# Patient Record
Sex: FEMALE | Race: WHITE | NOT HISPANIC OR LATINO | Employment: OTHER
[De-identification: names, ages, dates, MRNs, and addresses within clinical notes are randomized per-mention and may not be internally consistent; named-entity substitution may affect disease eponyms.]

---

## 2017-01-05 ENCOUNTER — RECORDS - HEALTHEAST (OUTPATIENT)
Dept: ADMINISTRATIVE | Facility: OTHER | Age: 67
End: 2017-01-05

## 2017-03-02 ENCOUNTER — OFFICE VISIT - HEALTHEAST (OUTPATIENT)
Dept: INTERNAL MEDICINE | Facility: CLINIC | Age: 67
End: 2017-03-02

## 2017-03-02 DIAGNOSIS — M17.0 PRIMARY OSTEOARTHRITIS OF BOTH KNEES: ICD-10-CM

## 2017-03-02 DIAGNOSIS — E55.9 VITAMIN D DEFICIENCY: ICD-10-CM

## 2017-03-02 DIAGNOSIS — E11.9 TYPE 2 DIABETES MELLITUS WITHOUT COMPLICATION (H): ICD-10-CM

## 2017-03-02 DIAGNOSIS — E78.5 HYPERLIPIDEMIA: ICD-10-CM

## 2017-03-02 DIAGNOSIS — R53.83 FATIGUE: ICD-10-CM

## 2017-03-02 DIAGNOSIS — Z00.00 ROUTINE GENERAL MEDICAL EXAMINATION AT A HEALTH CARE FACILITY: ICD-10-CM

## 2017-03-02 DIAGNOSIS — R05.3 PERSISTENT COUGH FOR 3 WEEKS OR LONGER: ICD-10-CM

## 2017-03-02 DIAGNOSIS — E04.2 MULTINODULAR GOITER: ICD-10-CM

## 2017-03-02 DIAGNOSIS — I10 ESSENTIAL HYPERTENSION: ICD-10-CM

## 2017-03-02 LAB
CHOLEST SERPL-MCNC: 218 MG/DL
FASTING STATUS PATIENT QL REPORTED: ABNORMAL
HBA1C MFR BLD: 7.3 % (ref 3.5–6)
HDLC SERPL-MCNC: 67 MG/DL
LDLC SERPL CALC-MCNC: 125 MG/DL
TRIGL SERPL-MCNC: 132 MG/DL

## 2017-03-03 ENCOUNTER — AMBULATORY - HEALTHEAST (OUTPATIENT)
Dept: INTERNAL MEDICINE | Facility: CLINIC | Age: 67
End: 2017-03-03

## 2017-03-06 ENCOUNTER — RECORDS - HEALTHEAST (OUTPATIENT)
Dept: ADMINISTRATIVE | Facility: OTHER | Age: 67
End: 2017-03-06

## 2017-04-27 ENCOUNTER — OFFICE VISIT (OUTPATIENT)
Dept: OBGYN | Facility: CLINIC | Age: 67
End: 2017-04-27
Payer: MEDICARE

## 2017-04-27 VITALS — SYSTOLIC BLOOD PRESSURE: 122 MMHG | DIASTOLIC BLOOD PRESSURE: 86 MMHG | HEIGHT: 61 IN

## 2017-04-27 DIAGNOSIS — R39.15 URGENCY OF MICTURITION: ICD-10-CM

## 2017-04-27 DIAGNOSIS — R35.0 FREQUENCY OF URINATION: ICD-10-CM

## 2017-04-27 DIAGNOSIS — Z01.419 ENCOUNTER FOR GYNECOLOGICAL EXAMINATION WITHOUT ABNORMAL FINDING: Primary | ICD-10-CM

## 2017-04-27 DIAGNOSIS — Z11.51 SCREENING FOR HUMAN PAPILLOMAVIRUS: ICD-10-CM

## 2017-04-27 PROCEDURE — 99207 ZZC NO CHARGE LOS: CPT | Performed by: OBSTETRICS & GYNECOLOGY

## 2017-04-27 PROCEDURE — G0101 CA SCREEN;PELVIC/BREAST EXAM: HCPCS | Performed by: OBSTETRICS & GYNECOLOGY

## 2017-04-27 PROCEDURE — G0145 SCR C/V CYTO,THINLAYER,RESCR: HCPCS | Performed by: OBSTETRICS & GYNECOLOGY

## 2017-04-27 NOTE — MR AVS SNAPSHOT
"              After Visit Summary   2017    Arnold Espinosa    MRN: [id number]           Patient Information     Date Of Birth          1950        Visit Information        Provider Department      2017 10:30 AM Last Lr MD [facility]        Today's Diagnoses     Encounter for gynecological examination without abnormal finding    -  1    Urgency of micturition        Frequency of urination           Follow-ups after your visit        Who to contact     If you have questions or need follow up information about today's clinic visit or your schedule please contact [facility] directly at [phone].  Normal or non-critical lab and imaging results will be communicated to you by MyChart, letter or phone within 4 business days after the clinic has received the results. If you do not hear from us within 7 days, please contact the clinic through Message [facility] or phone. If you have a critical or abnormal lab result, we will notify you by phone as soon as possible.  Submit refill requests through [employer] or call your pharmacy and they will forward the refill request to us. Please allow 3 business days for your refill to be completed.          Additional Information About Your Visit        MyChart Information     [employer] lets you send messages to your doctor, view your test results, renew your prescriptions, schedule appointments and more. To sign up, go to [email or web address] . Click on \"Log in\" on the left side of the screen, which will take you to the Welcome page. Then click on \"Sign up Now\" on the right side of the page.     You will be asked to enter the access code listed below, as well as some personal information. Please follow the directions to create your username and password.     Your access code is: [email or web address]  Expires: 2017 10:53 AM     Your access code will  in 90 days. If you need help or a new code, please call your [facility] " "clinic or [phone].        Care EveryWhere ID     This is your Care EveryWhere ID. This could be used by other organizations to access your [facility] medical records  [id number]        Your Vitals Were     Height                   5' 1\" (1.549 m)            Blood Pressure from Last 3 Encounters:   04/27/17 122/86   04/25/16 130/88   06/28/14 128/72    Weight from Last 3 Encounters:   06/28/14 200 lb (90.7 kg)              We Performed the Following     Medicare Limited Visit     Pap imaged thin layer screen reflex to HPV if ASCUS - recommended age 25 - 29 years        Primary Care Provider    None Specified       No primary provider on file.        Thank you!     Thank you for choosing [facility]  for your care. Our goal is always to provide you with excellent care. Hearing back from our patients is one way we can continue to improve our services. Please take a few minutes to complete the written survey that you may receive in the mail after your visit with us. Thank you!             Your Updated Medication List - Protect others around you: Learn how to safely use, store and throw away your medicines at www.disposemymeds.org.          This list is accurate as of: 4/27/17 11:19 AM.  Always use your most recent med list.                   Brand Name Dispense Instructions for use    ASPIRIN PO      Take 81 mg by mouth       calcium carbonate 500 MG tablet    OS-SHANTE 500 mg [place]. [place]     Take 600 mg by mouth 2 times daily       simvastatin 20 MG tablet    ZOCOR     1 TABLET AT BEDTIME       Vitamin D 09179 UNIT Caps      1 CAPSULE DAILY         "

## 2017-04-27 NOTE — PROGRESS NOTES
Arnold is a 66 year old  female who presents for Medicare Limited exam.     Do you have a Health Care Directive?: Yes: Advance Directive has been received and scanned.    HPI :  Patient seen for her annual exam.  She does complain of urinary urgency and frequency.  With some urge incontinence.  She had a previous sling procedure.    GYNECOLOGIC HISTORY:  No LMP recorded. Patient is postmenopausal..   reports that she has never smoked. She has never used smokeless tobacco.  STD testing offered?  Declined    Last PHQ-9 score on record=   PHQ-9 SCORE 2016   Total Score 0     Last GAD7 score on record=   VIPIN-7 SCORE 2016   Total Score 0       HEALTH MAINTENANCE:  Cholesterol:3/2/2017   Last Mammo: one year ago, Result: normal, Next Mammo: will schedule  Pap:  Lab Results   Component Value Date    PAP NIL 2016   DEXA: 2011, osteopenia Spine 2.7, Hip -1.4  Colonoscopy:  2017, Result:  normal, Next Colonoscopy: 5 years    HISTORY:  Obstetric History       T0      TAB0   SAB0   E0   M0   L0         No past medical history on file.  No past surgical history on file.  No family history on file.  Social History     Social History     Marital status: Single     Spouse name: N/A     Number of children: N/A     Years of education: N/A     Social History Main Topics     Smoking status: Never Smoker     Smokeless tobacco: Never Used     Alcohol use 0.0 oz/week     0 Standard drinks or equivalent per week      Comment: 0-1 drink per month     Drug use: No     Sexual activity: Not Currently     Partners: Male     Birth control/ protection: Post-menopausal     Other Topics Concern     Not on file     Social History Narrative     Current Outpatient Prescriptions   Medication Sig     ASPIRIN PO Take 81 mg by mouth     calcium carbonate (OS-SHANTE 500 MG [place]. [place]) 500 MG tablet Take 600 mg by mouth 2 times daily     VITAMIN D 49421 UNIT OR CAPS 1 CAPSULE DAILY     SIMVASTATIN 20 MG OR TABS 1  "TABLET AT BEDTIME     No current facility-administered medications for this visit.      Allergies   Allergen Reactions     Sulfa Drugs Hives       Past medical, surgical, social and family history were reviewed and updated in EPIC.    EXAM:  /86  Ht 5' 1\" (1.549 m)   BMI: There is no height or weight on file to calculate BMI.    Constitutional: Appearance: Well nourished, well developed alert, in no acute distress  Breasts: Inspection of Breasts:  No lymphadenopathy present    Palpation of Breasts and Axillae:  No masses present on palpation, no  breast tenderness    Axillary Lymph Nodes:  No lymphadenopathy present  Neurologic/Psychiatric:    Mental Status:  Oriented X3     Pelvic Exam:  External Genitalia:     Normal appearance for age, no discharge present, no tenderness present, no inflammatory lesions present, color normal  Vagina:     Normal vaginal vault without central or paravaginal defects, no discharge present, no inflammatory lesions present, no masses present  Bladder:     Nontender to palpation  Urethra:   Urethral Body:  Urethra palpation normal, urethra structural support normal   Urethral Meatus:  No erythema or lesions present  Cervix:     Appearance healthy, no lesions present, nontender to palpation, no bleeding present  Uterus:     Nontender to palpation, no masses present, position anteflexed, mobility: normal  Adnexa:     No adnexal tenderness present, no adnexal masses present  Perineum:     Perineum within normal limits, no evidence of trauma, no rashes or skin lesions present  Anus:     Anus within normal limits, no hemorrhoids present  Inguinal Lymph Nodes:     No lymphadenopathy present  Pubic Hair:     Normal pubic hair distribution for age  Genitalia and Groin:     No rashes present, no lesions present, no areas of discoloration, no masses present    There is no height or weight on file to calculate BMI.  Weight management plan: Patient was referred to their PCP to discuss a diet " and exercise plan.   Patient declined weight today   reports that she has never smoked. She has never used smokeless tobacco.      ASSESSMENT:  66 year old female with satisfactory annual exam.    ICD-10-CM    1. Encounter for gynecological examination without abnormal finding Z01.419 Pap imaged thin layer screen reflex to HPV if ASCUS - recommended age 25 - 29 years     Medicare Limited Visit   2. Urgency of micturition R39.15    3. Frequency of urination R35.0        COUNSELING:   Reviewed preventive health counseling, as reflected in patient instructions       Regular exercise       Healthy diet/nutrition    PLAN/PATIENT INSTRUCTIONS:    Plan: The patient is given information about metroplasty and urgent PCP.  I recommend that she consider the urgent PC.  She is not a candidate for InterStim because of previous back surgeries.  She will obtain her mammogram through [facility].        Last Lr MD

## 2017-04-27 NOTE — LETTER
May 8, 2017    Arnold Espinosa  [address] [place] [address]    Dear Arnold,  We are happy to inform you that your PAP smear result from 4/27/17 is normal.  We are now able to do a follow up test on PAP smears. The DNA test is for HPV (Human Papilloma Virus). Cervical cancer is closely linked with certain types of HPV. Your result showed no evidence of high risk HPV.  Therefore we recommend you return in 3 years for your next pap smear and HPV test.  You will still need to return to the clinic every year for an annual exam and other preventive tests.  Please contact the clinic at [phone] with any questions.  Sincerely,    Last Lr MD/lety

## 2017-05-01 LAB
COPATH REPORT: NORMAL
PAP: NORMAL

## 2017-05-01 PROCEDURE — 87624 HPV HI-RISK TYP POOLED RSLT: CPT | Performed by: OBSTETRICS & GYNECOLOGY

## 2017-05-04 LAB
FINAL DIAGNOSIS: NORMAL
HPV HR 12 DNA CVX QL NAA+PROBE: NEGATIVE
HPV16 DNA SPEC QL NAA+PROBE: NEGATIVE
HPV18 DNA SPEC QL NAA+PROBE: NEGATIVE
SPECIMEN DESCRIPTION: NORMAL

## 2017-05-06 ENCOUNTER — COMMUNICATION - HEALTHEAST (OUTPATIENT)
Dept: INTERNAL MEDICINE | Facility: CLINIC | Age: 67
End: 2017-05-06

## 2017-05-08 ENCOUNTER — RECORDS - HEALTHEAST (OUTPATIENT)
Dept: ADMINISTRATIVE | Facility: OTHER | Age: 67
End: 2017-05-08

## 2017-05-21 ENCOUNTER — COMMUNICATION - HEALTHEAST (OUTPATIENT)
Dept: INTERNAL MEDICINE | Facility: CLINIC | Age: 67
End: 2017-05-21

## 2017-05-22 ENCOUNTER — AMBULATORY - HEALTHEAST (OUTPATIENT)
Dept: INTERNAL MEDICINE | Facility: CLINIC | Age: 67
End: 2017-05-22

## 2017-06-22 ENCOUNTER — RECORDS - HEALTHEAST (OUTPATIENT)
Dept: ADMINISTRATIVE | Facility: OTHER | Age: 67
End: 2017-06-22

## 2017-06-22 ENCOUNTER — RECORDS - HEALTHEAST (OUTPATIENT)
Dept: BONE DENSITY | Facility: CLINIC | Age: 67
End: 2017-06-22

## 2017-06-22 DIAGNOSIS — Z00.00 ENCOUNTER FOR GENERAL ADULT MEDICAL EXAMINATION WITHOUT ABNORMAL FINDINGS: ICD-10-CM

## 2017-07-20 ENCOUNTER — RECORDS - HEALTHEAST (OUTPATIENT)
Dept: ADMINISTRATIVE | Facility: OTHER | Age: 67
End: 2017-07-20

## 2018-03-16 ENCOUNTER — RECORDS - HEALTHEAST (OUTPATIENT)
Dept: ADMINISTRATIVE | Facility: OTHER | Age: 68
End: 2018-03-16

## 2018-03-30 ENCOUNTER — RECORDS - HEALTHEAST (OUTPATIENT)
Dept: ADMINISTRATIVE | Facility: OTHER | Age: 68
End: 2018-03-30

## 2018-05-15 ENCOUNTER — RECORDS - HEALTHEAST (OUTPATIENT)
Dept: ADMINISTRATIVE | Facility: OTHER | Age: 68
End: 2018-05-15

## 2018-06-16 ENCOUNTER — COMMUNICATION - HEALTHEAST (OUTPATIENT)
Dept: INTERNAL MEDICINE | Facility: CLINIC | Age: 68
End: 2018-06-16

## 2018-07-17 ENCOUNTER — COMMUNICATION - HEALTHEAST (OUTPATIENT)
Dept: INTERNAL MEDICINE | Facility: CLINIC | Age: 68
End: 2018-07-17

## 2018-07-20 ENCOUNTER — RECORDS - HEALTHEAST (OUTPATIENT)
Dept: ADMINISTRATIVE | Facility: OTHER | Age: 68
End: 2018-07-20

## 2018-07-24 ENCOUNTER — OFFICE VISIT - HEALTHEAST (OUTPATIENT)
Dept: INTERNAL MEDICINE | Facility: CLINIC | Age: 68
End: 2018-07-24

## 2018-07-24 DIAGNOSIS — E55.9 VITAMIN D DEFICIENCY: ICD-10-CM

## 2018-07-24 DIAGNOSIS — R35.0 URINARY FREQUENCY: ICD-10-CM

## 2018-07-24 DIAGNOSIS — E78.5 HYPERLIPIDEMIA: ICD-10-CM

## 2018-07-24 DIAGNOSIS — M17.0 PRIMARY OSTEOARTHRITIS OF BOTH KNEES: ICD-10-CM

## 2018-07-24 DIAGNOSIS — M85.80 OSTEOPENIA: ICD-10-CM

## 2018-07-24 DIAGNOSIS — R79.89 ABNORMAL LFTS: ICD-10-CM

## 2018-07-24 DIAGNOSIS — E11.9 TYPE 2 DIABETES MELLITUS WITHOUT COMPLICATION, WITHOUT LONG-TERM CURRENT USE OF INSULIN (H): ICD-10-CM

## 2018-07-24 DIAGNOSIS — I10 ESSENTIAL HYPERTENSION: ICD-10-CM

## 2018-07-24 DIAGNOSIS — E66.01 MORBID OBESITY (H): ICD-10-CM

## 2018-07-24 LAB
ALBUMIN SERPL-MCNC: 4.1 G/DL (ref 3.5–5)
ALBUMIN UR-MCNC: ABNORMAL MG/DL
ALP SERPL-CCNC: 132 U/L (ref 45–120)
ALT SERPL W P-5'-P-CCNC: 61 U/L (ref 0–45)
ANION GAP SERPL CALCULATED.3IONS-SCNC: 11 MMOL/L (ref 5–18)
APPEARANCE UR: ABNORMAL
AST SERPL W P-5'-P-CCNC: 48 U/L (ref 0–40)
BILIRUB DIRECT SERPL-MCNC: 0.3 MG/DL
BILIRUB SERPL-MCNC: 0.9 MG/DL (ref 0–1)
BILIRUB UR QL STRIP: NEGATIVE
BUN SERPL-MCNC: 21 MG/DL (ref 8–22)
CALCIUM SERPL-MCNC: 10.4 MG/DL (ref 8.5–10.5)
CHLORIDE BLD-SCNC: 106 MMOL/L (ref 98–107)
CHOLEST SERPL-MCNC: 233 MG/DL
CO2 SERPL-SCNC: 26 MMOL/L (ref 22–31)
COLOR UR AUTO: YELLOW
CREAT SERPL-MCNC: 0.84 MG/DL (ref 0.6–1.1)
CREAT UR-MCNC: 172.3 MG/DL
FASTING STATUS PATIENT QL REPORTED: ABNORMAL
FERRITIN SERPL-MCNC: 98 NG/ML (ref 10–130)
GFR SERPL CREATININE-BSD FRML MDRD: >60 ML/MIN/1.73M2
GLUCOSE BLD-MCNC: 173 MG/DL (ref 70–125)
GLUCOSE UR STRIP-MCNC: NEGATIVE MG/DL
HBA1C MFR BLD: 10.4 % (ref 3.5–6)
HDLC SERPL-MCNC: 71 MG/DL
HGB BLD-MCNC: 16.1 G/DL (ref 12–16)
HGB UR QL STRIP: ABNORMAL
KETONES UR STRIP-MCNC: NEGATIVE MG/DL
LDLC SERPL CALC-MCNC: 133 MG/DL
LEUKOCYTE ESTERASE UR QL STRIP: ABNORMAL
MICROALBUMIN UR-MCNC: 22.82 MG/DL (ref 0–1.99)
MICROALBUMIN/CREAT UR: 132.4 MG/G
NITRATE UR QL: NEGATIVE
PH UR STRIP: 5.5 [PH] (ref 5–8)
POTASSIUM BLD-SCNC: 4.3 MMOL/L (ref 3.5–5)
PROT SERPL-MCNC: 7.4 G/DL (ref 6–8)
SODIUM SERPL-SCNC: 143 MMOL/L (ref 136–145)
SP GR UR STRIP: >=1.03 (ref 1–1.03)
TRIGL SERPL-MCNC: 147 MG/DL
UROBILINOGEN UR STRIP-ACNC: ABNORMAL

## 2018-07-25 ENCOUNTER — COMMUNICATION - HEALTHEAST (OUTPATIENT)
Dept: INTERNAL MEDICINE | Facility: CLINIC | Age: 68
End: 2018-07-25

## 2018-07-25 LAB
25(OH)D3 SERPL-MCNC: 54.3 NG/ML (ref 30–80)
25(OH)D3 SERPL-MCNC: 54.3 NG/ML (ref 30–80)
BACTERIA SPEC CULT: ABNORMAL
BACTERIA SPEC CULT: ABNORMAL
HCV AB SERPL QL IA: NEGATIVE

## 2018-07-29 ENCOUNTER — AMBULATORY - HEALTHEAST (OUTPATIENT)
Dept: INTERNAL MEDICINE | Facility: CLINIC | Age: 68
End: 2018-07-29

## 2018-09-16 ENCOUNTER — COMMUNICATION - HEALTHEAST (OUTPATIENT)
Dept: INTERNAL MEDICINE | Facility: CLINIC | Age: 68
End: 2018-09-16

## 2018-09-16 DIAGNOSIS — E78.5 HYPERLIPIDEMIA: ICD-10-CM

## 2018-10-15 ENCOUNTER — COMMUNICATION - HEALTHEAST (OUTPATIENT)
Dept: INTERNAL MEDICINE | Facility: CLINIC | Age: 68
End: 2018-10-15

## 2019-02-25 ENCOUNTER — RECORDS - HEALTHEAST (OUTPATIENT)
Dept: ADMINISTRATIVE | Facility: OTHER | Age: 69
End: 2019-02-25

## 2019-03-04 ENCOUNTER — RECORDS - HEALTHEAST (OUTPATIENT)
Dept: ADMINISTRATIVE | Facility: OTHER | Age: 69
End: 2019-03-04

## 2019-03-18 ENCOUNTER — RECORDS - HEALTHEAST (OUTPATIENT)
Dept: ADMINISTRATIVE | Facility: OTHER | Age: 69
End: 2019-03-18

## 2019-03-27 ENCOUNTER — COMMUNICATION - HEALTHEAST (OUTPATIENT)
Dept: INTERNAL MEDICINE | Facility: CLINIC | Age: 69
End: 2019-03-27

## 2019-03-27 DIAGNOSIS — E78.5 HYPERLIPIDEMIA: ICD-10-CM

## 2019-04-25 ENCOUNTER — RECORDS - HEALTHEAST (OUTPATIENT)
Dept: ADMINISTRATIVE | Facility: OTHER | Age: 69
End: 2019-04-25

## 2019-05-21 ENCOUNTER — RECORDS - HEALTHEAST (OUTPATIENT)
Dept: ADMINISTRATIVE | Facility: OTHER | Age: 69
End: 2019-05-21

## 2019-06-25 ENCOUNTER — COMMUNICATION - HEALTHEAST (OUTPATIENT)
Dept: INTERNAL MEDICINE | Facility: CLINIC | Age: 69
End: 2019-06-25

## 2019-06-25 DIAGNOSIS — E78.5 HYPERLIPIDEMIA: ICD-10-CM

## 2019-07-01 ENCOUNTER — COMMUNICATION - HEALTHEAST (OUTPATIENT)
Dept: INTERNAL MEDICINE | Facility: CLINIC | Age: 69
End: 2019-07-01

## 2019-07-01 DIAGNOSIS — E78.5 HYPERLIPIDEMIA: ICD-10-CM

## 2019-07-11 ENCOUNTER — RECORDS - HEALTHEAST (OUTPATIENT)
Dept: ADMINISTRATIVE | Facility: OTHER | Age: 69
End: 2019-07-11

## 2019-09-12 ENCOUNTER — RECORDS - HEALTHEAST (OUTPATIENT)
Dept: ADMINISTRATIVE | Facility: OTHER | Age: 69
End: 2019-09-12

## 2019-09-23 ENCOUNTER — COMMUNICATION - HEALTHEAST (OUTPATIENT)
Dept: INTERNAL MEDICINE | Facility: CLINIC | Age: 69
End: 2019-09-23

## 2019-09-23 DIAGNOSIS — E11.9 TYPE 2 DIABETES MELLITUS WITHOUT COMPLICATION, WITHOUT LONG-TERM CURRENT USE OF INSULIN (H): ICD-10-CM

## 2019-12-03 ENCOUNTER — RECORDS - HEALTHEAST (OUTPATIENT)
Dept: ADMINISTRATIVE | Facility: OTHER | Age: 69
End: 2019-12-03

## 2020-03-19 ENCOUNTER — RECORDS - HEALTHEAST (OUTPATIENT)
Dept: ADMINISTRATIVE | Facility: OTHER | Age: 70
End: 2020-03-19

## 2020-06-05 ENCOUNTER — RECORDS - HEALTHEAST (OUTPATIENT)
Dept: ADMINISTRATIVE | Facility: OTHER | Age: 70
End: 2020-06-05

## 2020-07-01 ENCOUNTER — RECORDS - HEALTHEAST (OUTPATIENT)
Dept: ADMINISTRATIVE | Facility: OTHER | Age: 70
End: 2020-07-01

## 2020-07-01 ENCOUNTER — RECORDS - HEALTHEAST (OUTPATIENT)
Dept: BONE DENSITY | Facility: CLINIC | Age: 70
End: 2020-07-01

## 2020-07-01 DIAGNOSIS — Z13.820 ENCOUNTER FOR SCREENING FOR OSTEOPOROSIS: ICD-10-CM

## 2020-07-01 DIAGNOSIS — Z78.0 ASYMPTOMATIC MENOPAUSAL STATE: ICD-10-CM

## 2020-09-24 ENCOUNTER — RECORDS - HEALTHEAST (OUTPATIENT)
Dept: ADMINISTRATIVE | Facility: OTHER | Age: 70
End: 2020-09-24

## 2021-05-27 NOTE — TELEPHONE ENCOUNTER
Refill Approved    Rx renewed per Medication Renewal Policy. Medication was last renewed on 9/17/18.    Alexandra Morgan, [facility] Connection Triage/Med Refill 3/27/2019     Requested Prescriptions   Pending Prescriptions Disp Refills     simvastatin (ZOCOR) 20 MG tablet [Pharmacy Med Name: SIMVASTATIN 20MG TABLETS] 90 tablet 0     Sig: TAKE 1 TABLET(20 MG) BY MOUTH DAILY    Statins Refill Protocol (Hmg CoA Reductase Inhibitors) Passed - 3/27/2019  1:13 PM       Passed - PCP or prescribing provider visit in past 12 months     Last office visit with prescriber/PCP: Visit date not found OR same dept: Visit date not found OR same specialty: Visit date not found  Last physical: 7/24/2018 Last MTM visit: Visit date not found   Next visit within 3 mo: Visit date not found  Next physical within 3 mo: Visit date not found  Prescriber OR PCP: José Miguel Hernandez MD  Last diagnosis associated with med order: 1. Hyperlipidemia  - simvastatin (ZOCOR) 20 MG tablet [Pharmacy Med Name: SIMVASTATIN 20MG TABLETS]; TAKE 1 TABLET(20 MG) BY MOUTH DAILY  Dispense: 90 tablet; Refill: 0    If protocol passes may refill for 12 months if within 3 months of last provider visit (or a total of 15 months).

## 2021-05-29 ENCOUNTER — RECORDS - HEALTHEAST (OUTPATIENT)
Dept: ADMINISTRATIVE | Facility: CLINIC | Age: 71
End: 2021-05-29

## 2021-05-30 ENCOUNTER — RECORDS - HEALTHEAST (OUTPATIENT)
Dept: ADMINISTRATIVE | Facility: CLINIC | Age: 71
End: 2021-05-30

## 2021-05-30 VITALS — BODY MASS INDEX: 35.9 KG/M2 | HEIGHT: 61 IN

## 2021-05-30 NOTE — TELEPHONE ENCOUNTER
Due to be seen    Rx renewed per Medication Renewal Policy. Medication was last renewed on 3/27/19.    Alexandra Morgan, Care Connection Triage/Med Refill 7/2/2019     Requested Prescriptions   Pending Prescriptions Disp Refills     simvastatin (ZOCOR) 20 MG tablet [Pharmacy Med Name: SIMVASTATIN 20MG TABLETS] 90 tablet 0     Sig: TAKE 1 TABLET(20 MG) BY MOUTH DAILY       Statins Refill Protocol (Hmg CoA Reductase Inhibitors) Passed - 7/1/2019  1:46 PM        Passed - PCP or prescribing provider visit in past 12 months      Last office visit with prescriber/PCP: Visit date not found OR same dept: Visit date not found OR same specialty: Visit date not found  Last physical: 7/24/2018 Last MTM visit: Visit date not found   Next visit within 3 mo: Visit date not found  Next physical within 3 mo: Visit date not found  Prescriber OR PCP: José Miguel Hernandez MD  Last diagnosis associated with med order: 1. Hyperlipidemia  - simvastatin (ZOCOR) 20 MG tablet [Pharmacy Med Name: SIMVASTATIN 20MG TABLETS]; TAKE 1 TABLET(20 MG) BY MOUTH DAILY  Dispense: 90 tablet; Refill: 0    If protocol passes may refill for 12 months if within 3 months of last provider visit (or a total of 15 months).

## 2021-06-01 VITALS — BODY MASS INDEX: 35.9 KG/M2 | HEIGHT: 61 IN

## 2021-06-01 NOTE — TELEPHONE ENCOUNTER
RN cannot approve Refill Request    RN can NOT refill this medication Protocol failed and NO refill given.         Alexandra Morgan, Care Connection Triage/Med Refill 9/23/2019    Requested Prescriptions   Pending Prescriptions Disp Refills     metFORMIN (GLUCOPHAGE-XR) 500 MG 24 hr tablet [Pharmacy Med Name: METFORMIN ER 500MG 24HR TABS] 90 tablet 3     Sig: TAKE 1 TABLET(500 MG) BY MOUTH DAILY WITH BREAKFAST       Metformin Refill Protocol Failed - 9/23/2019  5:10 AM        Failed - Blood pressure in last 12 months     BP Readings from Last 1 Encounters:   07/24/18 140/86             Failed - LFT or AST or ALT in last 12 months     Albumin   Date Value Ref Range Status   07/24/2018 4.1 3.5 - 5.0 g/dL Final     Bilirubin, Total   Date Value Ref Range Status   07/24/2018 0.9 0.0 - 1.0 mg/dL Final     Bilirubin, Direct   Date Value Ref Range Status   07/24/2018 0.3 <=0.5 mg/dL Final     Alkaline Phosphatase   Date Value Ref Range Status   07/24/2018 132 (H) 45 - 120 U/L Final     AST   Date Value Ref Range Status   07/24/2018 48 (H) 0 - 40 U/L Final     ALT   Date Value Ref Range Status   07/24/2018 61 (H) 0 - 45 U/L Final     Protein, Total   Date Value Ref Range Status   07/24/2018 7.4 6.0 - 8.0 g/dL Final                Failed - GFR or Serum Creatinine in last 6 months     GFR MDRD Non Af Amer   Date Value Ref Range Status   07/24/2018 >60 >60 mL/min/1.73m2 Final     GFR MDRD Af Amer   Date Value Ref Range Status   07/24/2018 >60 >60 mL/min/1.73m2 Final             Failed - Visit with PCP or prescribing provider visit in last 6 months or next 3 months     Last office visit with prescriber/PCP: Visit date not found OR same dept: Visit date not found OR same specialty: Visit date not found Last physical: Visit date not found Last MTM visit: Visit date not found         Next appt within 3 mo: Visit date not found  Next physical within 3 mo: Visit date not found  Prescriber OR PCP: José Miguel Hernandez MD  Last diagnosis  associated with med order: There are no diagnoses linked to this encounter.   If protocol passes may refill for 12 months if within 3 months of last provider visit (or a total of 15 months).           Failed - A1C in last 6 months     Hemoglobin A1c   Date Value Ref Range Status   07/24/2018 10.4 (H) 3.5 - 6.0 % Final               Failed - Microalbumin in last year      Microalbumin, Random Urine   Date Value Ref Range Status   07/24/2018 22.82 (H) 0.00 - 1.99 mg/dL Final

## 2021-06-09 NOTE — PROGRESS NOTES
Office Visit - Physical   Erin Espinosa   66 y.o.  female    Date of visit: 3/2/2017  Physician: José Miguel Hernandez MD     Assessment and Plan   1. Routine general medical examination at a health care facility  Immunizations reviewed and will provide Tdap.  Recommending Prevnar when turning 67 and Pneumovax one year later.  She declines having a flu shot.  She sees her ObGyn annually and has an appointment scheduled.  She continues to get a mammogram every year.  She had a colonoscopy in January 2017 and this should be repeated every 5 years with history of polyps.  She is exercising on a regular basis.  Nonsmoker.  Skin exam performed.  I would recommend repeating DEXA at age 67.  Her last was normal.  - DXA Bone Density Scan; Future    2. Type 2 diabetes mellitus without complication  Diabetes previously diet controlled but last hemoglobin A1c 7.6%.  She is getting an annual eye exam.  - Basic Metabolic Panel  - Microalbumin, Random Urine  - Urinalysis  - Glycosylated Hemoglobin A1c    Addendum-hemoglobin A1c remains over 7%.  We discussed starting Metformin XR and I will recommend when sending her results    3. Essential hypertension  Blood pressure is looking adequately controlled    4. Fatigue  May be related to diabetes.  We'll proceed with metabolic evaluation to exclude other etiologies.  - HM2(CBC w/o Differential)  - Thyroid Stimulating Hormone (TSH)    5. Hyperlipidemia  Remains on simvastatin.  She takes aspirin 81 mg daily.  - Lipid Cascade  - Hepatic Profile; Future  - Hepatic Profile    6. Multinodular goiter  Stable    7. Primary osteoarthritis of both knees  She has had both knees replaced    8. Vitamin D deficiency  She is on vitamin D replacement taking 50,000 units weekly.  We'll recheck level and if adequate, change to 5000 units daily  - Vitamin D, Total (25-Hydroxy)    9. Persistent cough for 3 weeks or longer  Persistent cough and complains of some dyspnea with exertion.  She is getting  some vague discomfort in her right upper back.  This pain is not related to exertion.  - XR Chest PA and Lateral    Return in about 1 year (around 3/2/2018) for Annual physical.     Chief Complaint   Chief Complaint   Patient presents with     Annual Exam        Patient Profile   Social History     Social History Narrative        Past Medical History   Patient Active Problem List   Diagnosis     Hypertension     S/P total knee arthroplasty     Type 2 diabetes mellitus without complication     Multinodular goiter     Irritable bowel syndrome     Osteoarthritis of both knees     Vitamin D deficiency     Hepatic steatosis     Lung abnormality     Hyperlipidemia     Glaucoma     Fatigue     Persistent cough for 3 weeks or longer       Past Surgical History  She has a past surgical history that includes Eye surgery; epidural abscess (2011); Carpal tunnel release; Cervical fusion (2005); Dilation and curettage of uterus (2003); total knee arthroplasty (Right, 7/29/2015); Total knee arthroplasty (Left, 2014); Cataract extraction (Bilateral); Posterior laminectomy / decompression lumbar spine; Bladder repair (2004); Tonsillectomy; Colonoscopy (2012 normal); and Rotator cuff repair (03/2016).     History of Present Illness   This 66 y.o. old woman is here for an annual physical, to follow-up chronic medical problems, and to discuss some new concerns.  She is felt significant fatigue and low energy.  She is concerned about her diabetes control.  Her last hemoglobin A1c was 7.6% ,previously it was diet controlled.  She currently does not check her sugars at home.  She is also having difficulty losing any weight despite watching her diet carefully, restricting her carbs, and exercising regularly with aerobics.  She had successful left rotator cuff surgery last year.  She has known multinodular goiter and this was last evaluated in 2014.  No history of hypothyroidism.  He continues to take simvastatin to manage her lipids.  She  has an infected tooth and will be having a root canal later today.  She has had both knees replaced.  She will take amoxicillin prior to her dental appointments.  She is also getting over an upper respiratory infection.  She does describe pain in her upper back.  This is not worse with exertion.  She has noticed some increasing dyspnea with exertion but no exertional chest pain.    Review of Systems: A comprehensive review of systems was negative except as noted.  General: There is increasing fatigue, difficulties losing weight, no fevers, chills, or night sweats  Eyes: No significant change in vision.  Seeing ophthalmologist regularly.  ENT: No ear or sinus infections.  Some hearing loss  Respiratory: Recent cough.  Some dyspnea with exertion.  Cardiovascular: No chest pain, palpitations, dizziness, or syncope.  No peripheral edema.  GI: No abdominal pain, reflux symptoms, dysphagia, nausea, vomiting, constipation, or diarrhea  : No change in frequency or incontinence.  No hematuria.  Skin: No new rashes or lesions.  Neurologic: No headaches, seizures, dizziness, weakness.  Chronic numbness involving both feet  Musculoskeletal: Chronic pain related to osteoarthritis and chronic low back pain.  She has had multiple back surgeries.  Lymphatic: No swollen lymph nodes  Psychiatric: No depression, anxiety, or sleep disorder.       Medications and Allergies   Current Outpatient Prescriptions   Medication Sig Dispense Refill     aspirin 81 mg chewable tablet Chew 81 mg daily.       CALCIUM CARBONATE/VITAMIN D3 (CALCIUM 600 + D,3, ORAL) Take 1 tablet by mouth daily.       simvastatin (ZOCOR) 20 MG tablet Take 1 tablet (20 mg total) by mouth daily. 90 tablet 3     VITAMIN D2 50,000 unit capsule TAKE ONE CAPSULE BY MOUTH ONCE WEEKLY ON SATURDAYS 12 capsule 3     No current facility-administered medications for this visit.      Allergies   Allergen Reactions     Sulfa (Sulfonamide Antibiotics) Hives     Trouble breathing     "    Family and Social History   Family History   Problem Relation Age of Onset     Anesthesia problems Father      PONV, AGITAED     Alzheimer's disease Father      Lung cancer Mother         Social History   Substance Use Topics     Smoking status: Never Smoker     Smokeless tobacco: None     Alcohol use Yes        Physical Exam   General Appearance:   Overweight middle-aged woman who otherwise appears well    Visit Vitals     /72 (Patient Site: Left Arm, Patient Position: Sitting, Cuff Size: Adult Large)     Pulse (!) 109     Ht 5' 1\" (1.549 m)     SpO2 96%       EYES: Eyelids, conjunctiva, and sclera were normal. Pupils were normal. Cornea, iris, and lens were normal bilaterally.  HEAD, EARS, NOSE, MOUTH, AND THROAT: Head and face were normal. Nose appearance was normal and there was no discharge. Oropharynx was normal.  NECK: Neck appearance was normal. There were no neck masses and the thyroid was not enlarged and no nodules are felt.  No lymphadenopathy.  RESPIRATORY: Breathing pattern was normal and the chest moved symmetrically.  Percussion/auscultatory percussion was normal.  Lung sounds were normal and there were no rales or wheezes.  CARDIOVASCULAR: Heart rate and rhythm were normal.  S1 and S2 were normal and there were no extra sounds or murmurs. Peripheral pulses in arms and legs were normal.  Jugular venous pressure was normal.  There was no peripheral edema.  No carotid bruits.  GASTROINTESTINAL: The abdomen was normal in contour.  Bowel sounds were present.  Percussion detected no organ enlargement or tenderness.  Palpation detected no tenderness, mass, or enlarged organs.   MUSCULOSKELETAL: Skeletal configuration was normal and muscle mass was normal for age. Joint appearance was overall normal.  LYMPHATIC: There were no enlarged nodes.  SKIN/HAIR/NAILS: Skin color was normal.  There were no skin lesions.  Hair and nails were normal.  FOOT EXAM: Normal vibratory sensation.  Good pedal pulses. "  No open sores or other lesions.  NEUROLOGIC: The patient was alert and oriented to person, place, time, and circumstance. Speech was normal. Cranial nerves were normal. Motor strength was normal for age. The patient was normally coordinated.  Sensation intact.  PSYCHIATRIC:  Mood and affect were normal and the patient had normal recent and remote memory. The patient's judgment and insight were normal.       Additional Information        José Miguel Hernandez MD  Internal Medicine  Contact me at [phone]

## 2021-06-19 NOTE — PROGRESS NOTES
Office Visit - Follow Up   Erin Espinosa   68 y.o. female    Date of Visit: 7/24/2018    Chief Complaint   Patient presents with     Annual Wellness Visit        Assessment and Plan   1. Type 2 diabetes mellitus without complication, without long-term current use of insulin (H)  A1c elevated last year over 7%.  Recommended metformin but did not start.  Has tried to work on lifestyle modification and has lost some weight.  Recheck A1c.  If still over 7%, would recommend starting 500 mg daily.  Will also recheck microalbumin.  Slightly elevated last year and discussed starting low-dose lisinopril if this remains elevated.  She is seeing an eye doctor every year.  Diabetic foot exam completed.  No peripheral neuropathy.  She does take a statin.  - Glycosylated Hemoglobin A1c  - Microalbumin, Random Urine  - Basic Metabolic Panel    2. Essential hypertension  Blood pressure running better outside of the office than what we saw today.  Consider low-dose lisinopril  - Hemoglobin    3. Hyperlipidemia  Recheck lipid profile on simvastatin.  She takes aspirin daily.  - Lipid Cascade    4. Primary osteoarthritis of both knees  She has had both knees replaced.    5. Morbid obesity (H)  We discussed the importance of further weight loss with more regular exercise and diet modification to reduce her risks    6. Osteopenia  We will plan to repeat a DEXA in 2019.  T score -2.0 last year.  Decreased from previous.  She tries to get adequate calcium in her diet and is taking vitamin D daily    7. Vitamin D deficiency  Recheck vitamin D level now that she is taking 5000 units daily rather than 50,000 units weekly  - Vitamin D, Total (25-Hydroxy)    8. Abnormal LFTs  Presumably related to fatty liver but will check appropriate studies.  - Hepatic Profile  - Hepatitis C Antibody (Anti-HCV)  - Ferritin    9. Urinary frequency  Probable overactive bladder.  She will follow-up with OB/GYN.  Suggesting Dr. Alarcon.  I am also  recommending Dr. Clay as a urologist  - Urinalysis-UC if Indicated      10.  Healthcare maintenance.  Prevnar provided.  Discussed Shingrix.  Up-to-date with colonoscopies.  Continues to get annual mammograms.  She will follow-up with OB/GYN.  Non-smoker.  We discussed getting more regular exercise.      Return in about 1 year (around 7/24/2019) for Annual physical.     History of Present Illness   This 68 y.o. old woman here for an annual wellness visit but declines having such visit performed in instead a comprehensive follow-up of her multiple medical problems was undertaken.  I addressed preventive and screening matters as well during the visit.  She has had both knees replaced and has ongoing chronic low back pain from severe arthritis.  She is coping with her pain.  Having increasing problems with urinary frequency.  Already doing Kegel exercises.  She has type 2 diabetes and her A1c was elevated last year above 7%.  Metformin was recommended but she never started.  Also found to have slightly elevated microalbumin.  She is reluctant to take lisinopril.  Blood pressure normally runs well controlled and can even be low at times.  She has difficulty losing weight.  Chronic back pain makes exercising difficult.  She has osteopenia and tries to get plenty of calcium in her diet and does take a vitamin supplement.  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  Continues on simvastatin to manage her cholesterol.  Denies any exertional chest pain.  Chronic cough did resolve.  Some occasional reflux.    Review of Systems:  Otherwise, a comprehensive review of systems was negative except as noted.     Medications, Allergies and Problem List   Patient Active Problem List   Diagnosis     Hypertension     S/P total knee arthroplasty     Type 2 diabetes mellitus without complication (H)     Multinodular goiter     Irritable bowel syndrome      "Osteoarthritis of both knees     Vitamin D deficiency     Hepatic steatosis     Hyperlipidemia     Glaucoma     Fatigue     Personal history of colonic polyps     Osteopenia     Morbid obesity (H)     Abnormal LFTs     Urinary frequency       She has a past surgical history that includes Eye surgery; epidural abscess (2011); Carpal tunnel release; Cervical fusion (2005); Dilation and curettage of uterus (2003); pr total knee arthroplasty (Right, 7/29/2015); Total knee arthroplasty (Left, 2014); Cataract extraction (Bilateral); Posterior laminectomy / decompression lumbar spine; Bladder repair (2004); Tonsillectomy; and Rotator cuff repair (03/2016).    Allergies   Allergen Reactions     Sulfa (Sulfonamide Antibiotics) Hives     Trouble breathing       Current Outpatient Prescriptions   Medication Sig Dispense Refill     CALCIUM CARBONATE/VITAMIN D3 (CALCIUM 600 + D,3, ORAL) Take 1 tablet by mouth daily.       cholecalciferol, vitamin D3, 5,000 unit capsule Take 5,000 Units by mouth daily.  0     simvastatin (ZOCOR) 20 MG tablet Take 1 tablet (20 mg total) by mouth daily. Follow-up appointment is necessary for future refills 90 tablet 0     aspirin 325 MG EC tablet Take 1 tablet (325 mg total) by mouth daily. 100 tablet 3     No current facility-administered medications for this visit.         Physical Exam   General Appearance:   Obese middle-aged woman who otherwise appears well    /86  Pulse (!) 106  Ht 5' 1\" (1.549 m)  SpO2 96%    HEENT: Normal  Neck without lymphadenopathy or other masses  Respiratory: Normal respiratory effort.  Lungs are clear with no rales or wheezes.  Heart: Regular rate and rhythm without murmurs, rubs, or gallops.  No carotid bruits.  Abdomen: Abdomen is soft, nontender without guarding, rebound, masses, or hepatosplenomegaly.  Extremities: No peripheral edema.  Good pedal pulses  Neurologic: Grossly nonfocal  Diabetic foot exam with normal vibratory and pinprick sensation.  Good " pedal pulses and no other abnormalities  Skin: No cyanosis or pallor  Psych: Alert and oriented ×3, mood appropriate         Additional Information   Social History   Substance Use Topics     Smoking status: Never Smoker     Smokeless tobacco: Never Used     Alcohol use Yes             Time: total time spent with the patient was 40 minutes of which >50% was spent in counseling and coordination of care     José Miguel Hernandez MD

## 2021-08-28 ENCOUNTER — HEALTH MAINTENANCE LETTER (OUTPATIENT)
Age: 71
End: 2021-08-28

## 2021-09-09 ENCOUNTER — TRANSFERRED RECORDS (OUTPATIENT)
Dept: HEALTH INFORMATION MANAGEMENT | Facility: CLINIC | Age: 71
End: 2021-09-09

## 2021-10-23 ENCOUNTER — HEALTH MAINTENANCE LETTER (OUTPATIENT)
Age: 71
End: 2021-10-23

## 2022-10-10 ENCOUNTER — HEALTH MAINTENANCE LETTER (OUTPATIENT)
Age: 72
End: 2022-10-10

## 2022-12-02 ENCOUNTER — TRANSFERRED RECORDS (OUTPATIENT)
Dept: HEALTH INFORMATION MANAGEMENT | Facility: CLINIC | Age: 72
End: 2022-12-02

## 2023-04-24 RX ORDER — LISINOPRIL 10 MG/1
10 TABLET ORAL DAILY
COMMUNITY
Start: 2022-09-06

## 2023-04-24 RX ORDER — METFORMIN HCL 500 MG
1 TABLET, EXTENDED RELEASE 24 HR ORAL DAILY
COMMUNITY
Start: 2022-06-06

## 2023-04-24 RX ORDER — ATORVASTATIN CALCIUM 40 MG/1
40 TABLET, FILM COATED ORAL AT BEDTIME
COMMUNITY
Start: 2022-09-06

## 2023-04-25 RX ORDER — VANCOMYCIN HYDROCHLORIDE 1 G/20ML
1 INJECTION, POWDER, LYOPHILIZED, FOR SOLUTION INTRAVENOUS ONCE
Status: CANCELLED | OUTPATIENT
Start: 2023-04-26

## 2023-04-26 ENCOUNTER — ANESTHESIA (OUTPATIENT)
Dept: SURGERY | Facility: CLINIC | Age: 73
End: 2023-04-26
Payer: MEDICARE

## 2023-04-26 ENCOUNTER — APPOINTMENT (OUTPATIENT)
Dept: RADIOLOGY | Facility: CLINIC | Age: 73
End: 2023-04-26
Attending: ORTHOPAEDIC SURGERY
Payer: MEDICARE

## 2023-04-26 ENCOUNTER — HOSPITAL ENCOUNTER (OUTPATIENT)
Facility: CLINIC | Age: 73
Discharge: HOME | End: 2023-04-27
Attending: ORTHOPAEDIC SURGERY | Admitting: ORTHOPAEDIC SURGERY
Payer: MEDICARE

## 2023-04-26 ENCOUNTER — ANESTHESIA EVENT (OUTPATIENT)
Dept: SURGERY | Facility: CLINIC | Age: 73
End: 2023-04-26
Payer: MEDICARE

## 2023-04-26 DIAGNOSIS — Z96.611 S/P REVERSE TOTAL SHOULDER ARTHROPLASTY, RIGHT: Primary | ICD-10-CM

## 2023-04-26 LAB
ABO/RH(D): NORMAL
ANTIBODY SCREEN: NEGATIVE
ERYTHROCYTE [DISTWIDTH] IN BLOOD BY AUTOMATED COUNT: 13.5 % (ref 10–15)
GLUCOSE BLDC GLUCOMTR-MCNC: 103 MG/DL (ref 70–99)
GLUCOSE BLDC GLUCOMTR-MCNC: 119 MG/DL (ref 70–99)
GLUCOSE BLDC GLUCOMTR-MCNC: 145 MG/DL (ref 70–99)
HCT VFR BLD AUTO: 40.1 % (ref 35–47)
HGB BLD-MCNC: 13.2 G/DL (ref 11.7–15.7)
MCH RBC QN AUTO: 28.3 PG (ref 26.5–33)
MCHC RBC AUTO-ENTMCNC: 32.9 G/DL (ref 31.5–36.5)
MCV RBC AUTO: 86 FL (ref 78–100)
PLATELET # BLD AUTO: 207 10E3/UL (ref 150–450)
RBC # BLD AUTO: 4.66 10E6/UL (ref 3.8–5.2)
SARS-COV-2 RNA RESP QL NAA+PROBE: NEGATIVE
SPECIMEN EXPIRATION DATE: NORMAL
WBC # BLD AUTO: 7.7 10E3/UL (ref 4–11)

## 2023-04-26 PROCEDURE — 250N000011 HC RX IP 250 OP 636: Performed by: PHYSICIAN ASSISTANT

## 2023-04-26 PROCEDURE — 250N000013 HC RX MED GY IP 250 OP 250 PS 637: Performed by: ORTHOPAEDIC SURGERY

## 2023-04-26 PROCEDURE — U0005 INFEC AGEN DETEC AMPLI PROBE: HCPCS | Performed by: ORTHOPAEDIC SURGERY

## 2023-04-26 PROCEDURE — 82962 GLUCOSE BLOOD TEST: CPT

## 2023-04-26 PROCEDURE — 360N000077 HC SURGERY LEVEL 4, PER MIN: Performed by: ORTHOPAEDIC SURGERY

## 2023-04-26 PROCEDURE — 250N000009 HC RX 250: Performed by: ORTHOPAEDIC SURGERY

## 2023-04-26 PROCEDURE — 999N000065 XR SHOULDER RIGHT PORT G/E 2 VIEWS: Mod: RT

## 2023-04-26 PROCEDURE — C9290 INJ, BUPIVACAINE LIPOSOME: HCPCS | Performed by: ANESTHESIOLOGY

## 2023-04-26 PROCEDURE — 258N000003 HC RX IP 258 OP 636: Performed by: ANESTHESIOLOGY

## 2023-04-26 PROCEDURE — 250N000013 HC RX MED GY IP 250 OP 250 PS 637: Performed by: PHYSICIAN ASSISTANT

## 2023-04-26 PROCEDURE — 85014 HEMATOCRIT: CPT | Performed by: PHYSICIAN ASSISTANT

## 2023-04-26 PROCEDURE — 86901 BLOOD TYPING SEROLOGIC RH(D): CPT | Performed by: PHYSICIAN ASSISTANT

## 2023-04-26 PROCEDURE — 250N000011 HC RX IP 250 OP 636: Performed by: ANESTHESIOLOGY

## 2023-04-26 PROCEDURE — 272N000001 HC OR GENERAL SUPPLY STERILE: Performed by: ORTHOPAEDIC SURGERY

## 2023-04-26 PROCEDURE — 36415 COLL VENOUS BLD VENIPUNCTURE: CPT | Performed by: PHYSICIAN ASSISTANT

## 2023-04-26 PROCEDURE — 250N000025 HC SEVOFLURANE, PER MIN: Performed by: ORTHOPAEDIC SURGERY

## 2023-04-26 PROCEDURE — 250N000009 HC RX 250: Performed by: NURSE ANESTHETIST, CERTIFIED REGISTERED

## 2023-04-26 PROCEDURE — 250N000011 HC RX IP 250 OP 636: Performed by: ORTHOPAEDIC SURGERY

## 2023-04-26 PROCEDURE — 999N000141 HC STATISTIC PRE-PROCEDURE NURSING ASSESSMENT: Performed by: ORTHOPAEDIC SURGERY

## 2023-04-26 PROCEDURE — C1713 ANCHOR/SCREW BN/BN,TIS/BN: HCPCS | Performed by: ORTHOPAEDIC SURGERY

## 2023-04-26 PROCEDURE — 258N000003 HC RX IP 258 OP 636: Performed by: NURSE ANESTHETIST, CERTIFIED REGISTERED

## 2023-04-26 PROCEDURE — 250N000011 HC RX IP 250 OP 636: Performed by: NURSE ANESTHETIST, CERTIFIED REGISTERED

## 2023-04-26 PROCEDURE — 258N000003 HC RX IP 258 OP 636: Performed by: ORTHOPAEDIC SURGERY

## 2023-04-26 PROCEDURE — 710N000010 HC RECOVERY PHASE 1, LEVEL 2, PER MIN: Performed by: ORTHOPAEDIC SURGERY

## 2023-04-26 PROCEDURE — C1776 JOINT DEVICE (IMPLANTABLE): HCPCS | Performed by: ORTHOPAEDIC SURGERY

## 2023-04-26 PROCEDURE — 370N000017 HC ANESTHESIA TECHNICAL FEE, PER MIN: Performed by: ORTHOPAEDIC SURGERY

## 2023-04-26 DEVICE — SCREW CENTRAL 6.5X40MM DWJ140: Type: IMPLANTABLE DEVICE | Site: SHOULDER | Status: FUNCTIONAL

## 2023-04-26 DEVICE — SCREW PERIPHERAL 5.0X30MM DWJ330: Type: IMPLANTABLE DEVICE | Site: SHOULDER | Status: FUNCTIONAL

## 2023-04-26 DEVICE — SCREW PERIPHERAL 26MM: Type: IMPLANTABLE DEVICE | Site: SHOULDER | Status: FUNCTIONAL

## 2023-04-26 DEVICE — IMPLANTABLE DEVICE
Type: IMPLANTABLE DEVICE | Site: SHOULDER | Status: FUNCTIONAL
Brand: TORNIER PERFORM® REVERSED GLENOID

## 2023-04-26 DEVICE — IMPLANTABLE DEVICE
Type: IMPLANTABLE DEVICE | Site: SHOULDER | Status: FUNCTIONAL
Brand: TORNIER FLEX SHOULDER SYSTEM

## 2023-04-26 DEVICE — SCREW PERIPHERAL 14MM DWJ314: Type: IMPLANTABLE DEVICE | Site: SHOULDER | Status: FUNCTIONAL

## 2023-04-26 RX ORDER — ONDANSETRON 2 MG/ML
4 INJECTION INTRAMUSCULAR; INTRAVENOUS EVERY 30 MIN PRN
Status: DISCONTINUED | OUTPATIENT
Start: 2023-04-26 | End: 2023-04-26 | Stop reason: HOSPADM

## 2023-04-26 RX ORDER — ACETAMINOPHEN 325 MG/1
975 TABLET ORAL EVERY 8 HOURS
Status: DISCONTINUED | OUTPATIENT
Start: 2023-04-26 | End: 2023-04-27 | Stop reason: HOSPADM

## 2023-04-26 RX ORDER — OXYCODONE HYDROCHLORIDE 5 MG/1
5 TABLET ORAL EVERY 4 HOURS PRN
Status: DISCONTINUED | OUTPATIENT
Start: 2023-04-26 | End: 2023-04-27 | Stop reason: HOSPADM

## 2023-04-26 RX ORDER — PROCHLORPERAZINE MALEATE 5 MG
5 TABLET ORAL EVERY 6 HOURS PRN
Status: DISCONTINUED | OUTPATIENT
Start: 2023-04-26 | End: 2023-04-27 | Stop reason: HOSPADM

## 2023-04-26 RX ORDER — HYDROMORPHONE HCL IN WATER/PF 6 MG/30 ML
0.2 PATIENT CONTROLLED ANALGESIA SYRINGE INTRAVENOUS EVERY 5 MIN PRN
Status: DISCONTINUED | OUTPATIENT
Start: 2023-04-26 | End: 2023-04-26 | Stop reason: HOSPADM

## 2023-04-26 RX ORDER — ONDANSETRON 2 MG/ML
4 INJECTION INTRAMUSCULAR; INTRAVENOUS EVERY 6 HOURS PRN
Status: DISCONTINUED | OUTPATIENT
Start: 2023-04-26 | End: 2023-04-27 | Stop reason: HOSPADM

## 2023-04-26 RX ORDER — ONDANSETRON 4 MG/1
4 TABLET, ORALLY DISINTEGRATING ORAL EVERY 30 MIN PRN
Status: DISCONTINUED | OUTPATIENT
Start: 2023-04-26 | End: 2023-04-26 | Stop reason: HOSPADM

## 2023-04-26 RX ORDER — NALOXONE HYDROCHLORIDE 0.4 MG/ML
0.4 INJECTION, SOLUTION INTRAMUSCULAR; INTRAVENOUS; SUBCUTANEOUS
Status: DISCONTINUED | OUTPATIENT
Start: 2023-04-26 | End: 2023-04-27 | Stop reason: HOSPADM

## 2023-04-26 RX ORDER — CEFAZOLIN SODIUM/WATER 2 G/20 ML
2 SYRINGE (ML) INTRAVENOUS SEE ADMIN INSTRUCTIONS
Status: DISCONTINUED | OUTPATIENT
Start: 2023-04-26 | End: 2023-04-26 | Stop reason: HOSPADM

## 2023-04-26 RX ORDER — HYDROMORPHONE HCL IN WATER/PF 6 MG/30 ML
0.4 PATIENT CONTROLLED ANALGESIA SYRINGE INTRAVENOUS
Status: DISCONTINUED | OUTPATIENT
Start: 2023-04-26 | End: 2023-04-27 | Stop reason: HOSPADM

## 2023-04-26 RX ORDER — DEXAMETHASONE SODIUM PHOSPHATE 10 MG/ML
INJECTION, SOLUTION INTRAMUSCULAR; INTRAVENOUS PRN
Status: DISCONTINUED | OUTPATIENT
Start: 2023-04-26 | End: 2023-04-26

## 2023-04-26 RX ORDER — HYDROMORPHONE HCL IN WATER/PF 6 MG/30 ML
0.2 PATIENT CONTROLLED ANALGESIA SYRINGE INTRAVENOUS
Status: DISCONTINUED | OUTPATIENT
Start: 2023-04-26 | End: 2023-04-27 | Stop reason: HOSPADM

## 2023-04-26 RX ORDER — NALOXONE HYDROCHLORIDE 0.4 MG/ML
0.2 INJECTION, SOLUTION INTRAMUSCULAR; INTRAVENOUS; SUBCUTANEOUS
Status: DISCONTINUED | OUTPATIENT
Start: 2023-04-26 | End: 2023-04-27 | Stop reason: HOSPADM

## 2023-04-26 RX ORDER — BISACODYL 10 MG
10 SUPPOSITORY, RECTAL RECTAL DAILY PRN
Status: DISCONTINUED | OUTPATIENT
Start: 2023-04-26 | End: 2023-04-27 | Stop reason: HOSPADM

## 2023-04-26 RX ORDER — OXYCODONE HYDROCHLORIDE 5 MG/1
10 TABLET ORAL EVERY 4 HOURS PRN
Status: DISCONTINUED | OUTPATIENT
Start: 2023-04-26 | End: 2023-04-27 | Stop reason: HOSPADM

## 2023-04-26 RX ORDER — OXYCODONE HYDROCHLORIDE 5 MG/1
5-10 TABLET ORAL EVERY 4 HOURS PRN
Qty: 25 TABLET | Refills: 0 | Status: SHIPPED | OUTPATIENT
Start: 2023-04-26

## 2023-04-26 RX ORDER — ACETAMINOPHEN 325 MG/1
650 TABLET ORAL EVERY 4 HOURS PRN
Status: DISCONTINUED | OUTPATIENT
Start: 2023-04-29 | End: 2023-04-27 | Stop reason: HOSPADM

## 2023-04-26 RX ORDER — POLYETHYLENE GLYCOL 3350 17 G/17G
17 POWDER, FOR SOLUTION ORAL DAILY
Status: DISCONTINUED | OUTPATIENT
Start: 2023-04-27 | End: 2023-04-27 | Stop reason: HOSPADM

## 2023-04-26 RX ORDER — SODIUM CHLORIDE, SODIUM LACTATE, POTASSIUM CHLORIDE, CALCIUM CHLORIDE 600; 310; 30; 20 MG/100ML; MG/100ML; MG/100ML; MG/100ML
INJECTION, SOLUTION INTRAVENOUS CONTINUOUS
Status: DISCONTINUED | OUTPATIENT
Start: 2023-04-26 | End: 2023-04-26 | Stop reason: HOSPADM

## 2023-04-26 RX ORDER — HYDROXYZINE HYDROCHLORIDE 10 MG/1
10 TABLET, FILM COATED ORAL EVERY 6 HOURS PRN
Qty: 30 TABLET | Refills: 0 | Status: SHIPPED | OUTPATIENT
Start: 2023-04-26

## 2023-04-26 RX ORDER — LISINOPRIL 10 MG/1
10 TABLET ORAL DAILY
Status: DISCONTINUED | OUTPATIENT
Start: 2023-04-27 | End: 2023-04-27 | Stop reason: HOSPADM

## 2023-04-26 RX ORDER — BUPIVACAINE HYDROCHLORIDE 2.5 MG/ML
INJECTION, SOLUTION EPIDURAL; INFILTRATION; INTRACAUDAL
Status: COMPLETED | OUTPATIENT
Start: 2023-04-26 | End: 2023-04-26

## 2023-04-26 RX ORDER — VANCOMYCIN HYDROCHLORIDE 1 G/20ML
INJECTION, POWDER, LYOPHILIZED, FOR SOLUTION INTRAVENOUS
Status: DISCONTINUED
Start: 2023-04-26 | End: 2023-04-26 | Stop reason: HOSPADM

## 2023-04-26 RX ORDER — ACETAMINOPHEN 325 MG/1
650 TABLET ORAL EVERY 4 HOURS PRN
Qty: 100 TABLET | Refills: 0 | Status: SHIPPED | OUTPATIENT
Start: 2023-04-26

## 2023-04-26 RX ORDER — LIDOCAINE 40 MG/G
CREAM TOPICAL
Status: DISCONTINUED | OUTPATIENT
Start: 2023-04-26 | End: 2023-04-26 | Stop reason: HOSPADM

## 2023-04-26 RX ORDER — VANCOMYCIN HYDROCHLORIDE 1 G/20ML
INJECTION, POWDER, LYOPHILIZED, FOR SOLUTION INTRAVENOUS PRN
Status: DISCONTINUED | OUTPATIENT
Start: 2023-04-26 | End: 2023-04-26 | Stop reason: HOSPADM

## 2023-04-26 RX ORDER — AMOXICILLIN 250 MG
1 CAPSULE ORAL 2 TIMES DAILY
Status: DISCONTINUED | OUTPATIENT
Start: 2023-04-26 | End: 2023-04-27 | Stop reason: HOSPADM

## 2023-04-26 RX ORDER — FENTANYL CITRATE 50 UG/ML
50 INJECTION, SOLUTION INTRAMUSCULAR; INTRAVENOUS EVERY 5 MIN PRN
Status: DISCONTINUED | OUTPATIENT
Start: 2023-04-26 | End: 2023-04-26 | Stop reason: HOSPADM

## 2023-04-26 RX ORDER — TRANEXAMIC ACID 650 MG/1
1950 TABLET ORAL ONCE
Status: COMPLETED | OUTPATIENT
Start: 2023-04-26 | End: 2023-04-26

## 2023-04-26 RX ORDER — HYDROMORPHONE HCL IN WATER/PF 6 MG/30 ML
0.4 PATIENT CONTROLLED ANALGESIA SYRINGE INTRAVENOUS EVERY 5 MIN PRN
Status: DISCONTINUED | OUTPATIENT
Start: 2023-04-26 | End: 2023-04-26 | Stop reason: HOSPADM

## 2023-04-26 RX ORDER — FENTANYL CITRATE 50 UG/ML
25 INJECTION, SOLUTION INTRAMUSCULAR; INTRAVENOUS EVERY 5 MIN PRN
Status: DISCONTINUED | OUTPATIENT
Start: 2023-04-26 | End: 2023-04-26 | Stop reason: HOSPADM

## 2023-04-26 RX ORDER — HYDROXYZINE HYDROCHLORIDE 10 MG/1
10 TABLET, FILM COATED ORAL EVERY 6 HOURS PRN
Status: DISCONTINUED | OUTPATIENT
Start: 2023-04-26 | End: 2023-04-27 | Stop reason: HOSPADM

## 2023-04-26 RX ORDER — FENTANYL CITRATE 50 UG/ML
50 INJECTION, SOLUTION INTRAMUSCULAR; INTRAVENOUS
Status: DISCONTINUED | OUTPATIENT
Start: 2023-04-26 | End: 2023-04-26 | Stop reason: HOSPADM

## 2023-04-26 RX ORDER — ONDANSETRON 4 MG/1
4 TABLET, ORALLY DISINTEGRATING ORAL EVERY 6 HOURS PRN
Status: DISCONTINUED | OUTPATIENT
Start: 2023-04-26 | End: 2023-04-27 | Stop reason: HOSPADM

## 2023-04-26 RX ORDER — CEFAZOLIN SODIUM 2 G/100ML
2 INJECTION, SOLUTION INTRAVENOUS EVERY 8 HOURS
Status: COMPLETED | OUTPATIENT
Start: 2023-04-26 | End: 2023-04-27

## 2023-04-26 RX ORDER — CALCIUM CARBONATE/VITAMIN D3 600 MG-10
1 TABLET ORAL DAILY
COMMUNITY

## 2023-04-26 RX ORDER — LIDOCAINE 40 MG/G
CREAM TOPICAL
Status: DISCONTINUED | OUTPATIENT
Start: 2023-04-26 | End: 2023-04-27 | Stop reason: HOSPADM

## 2023-04-26 RX ORDER — SODIUM CHLORIDE, SODIUM LACTATE, POTASSIUM CHLORIDE, CALCIUM CHLORIDE 600; 310; 30; 20 MG/100ML; MG/100ML; MG/100ML; MG/100ML
INJECTION, SOLUTION INTRAVENOUS CONTINUOUS
Status: DISCONTINUED | OUTPATIENT
Start: 2023-04-26 | End: 2023-04-27 | Stop reason: HOSPADM

## 2023-04-26 RX ORDER — CEFAZOLIN SODIUM/WATER 2 G/20 ML
2 SYRINGE (ML) INTRAVENOUS
Status: COMPLETED | OUTPATIENT
Start: 2023-04-26 | End: 2023-04-26

## 2023-04-26 RX ORDER — FENTANYL CITRATE 50 UG/ML
100 INJECTION, SOLUTION INTRAMUSCULAR; INTRAVENOUS
Status: DISCONTINUED | OUTPATIENT
Start: 2023-04-26 | End: 2023-04-26 | Stop reason: HOSPADM

## 2023-04-26 RX ORDER — MAGNESIUM HYDROXIDE 1200 MG/15ML
LIQUID ORAL PRN
Status: DISCONTINUED | OUTPATIENT
Start: 2023-04-26 | End: 2023-04-26 | Stop reason: HOSPADM

## 2023-04-26 RX ORDER — ATORVASTATIN CALCIUM 40 MG/1
40 TABLET, FILM COATED ORAL AT BEDTIME
Status: DISCONTINUED | OUTPATIENT
Start: 2023-04-26 | End: 2023-04-27 | Stop reason: HOSPADM

## 2023-04-26 RX ORDER — PROPOFOL 10 MG/ML
INJECTION, EMULSION INTRAVENOUS CONTINUOUS PRN
Status: DISCONTINUED | OUTPATIENT
Start: 2023-04-26 | End: 2023-04-26

## 2023-04-26 RX ORDER — METFORMIN HCL 500 MG
500 TABLET, EXTENDED RELEASE 24 HR ORAL DAILY
Status: DISCONTINUED | OUTPATIENT
Start: 2023-04-26 | End: 2023-04-27 | Stop reason: HOSPADM

## 2023-04-26 RX ORDER — PROPOFOL 10 MG/ML
INJECTION, EMULSION INTRAVENOUS PRN
Status: DISCONTINUED | OUTPATIENT
Start: 2023-04-26 | End: 2023-04-26

## 2023-04-26 RX ADMIN — ROCURONIUM BROMIDE 20 MG: 10 INJECTION, SOLUTION INTRAVENOUS at 14:32

## 2023-04-26 RX ADMIN — DEXAMETHASONE SODIUM PHOSPHATE 4 MG: 10 INJECTION, SOLUTION INTRAMUSCULAR; INTRAVENOUS at 13:06

## 2023-04-26 RX ADMIN — CEFAZOLIN SODIUM 2 G: 2 INJECTION, SOLUTION INTRAVENOUS at 20:27

## 2023-04-26 RX ADMIN — MIDAZOLAM HYDROCHLORIDE 2 MG: 1 INJECTION, SOLUTION INTRAMUSCULAR; INTRAVENOUS at 12:19

## 2023-04-26 RX ADMIN — PHENYLEPHRINE HYDROCHLORIDE 0.03 MCG/KG/MIN: 10 INJECTION INTRAVENOUS at 13:46

## 2023-04-26 RX ADMIN — BUPIVACAINE HYDROCHLORIDE 3 ML: 2.5 INJECTION, SOLUTION EPIDURAL; INFILTRATION; INTRACAUDAL at 12:23

## 2023-04-26 RX ADMIN — ACETAMINOPHEN 975 MG: 325 TABLET ORAL at 18:19

## 2023-04-26 RX ADMIN — ASPIRIN 325 MG: 325 TABLET, DELAYED RELEASE ORAL at 18:19

## 2023-04-26 RX ADMIN — ATORVASTATIN CALCIUM 40 MG: 40 TABLET, FILM COATED ORAL at 20:28

## 2023-04-26 RX ADMIN — Medication 2 G: at 13:01

## 2023-04-26 RX ADMIN — ROCURONIUM BROMIDE 50 MG: 10 INJECTION, SOLUTION INTRAVENOUS at 13:06

## 2023-04-26 RX ADMIN — PHENYLEPHRINE HYDROCHLORIDE 200 MCG: 10 INJECTION INTRAVENOUS at 13:32

## 2023-04-26 RX ADMIN — SODIUM CHLORIDE, POTASSIUM CHLORIDE, SODIUM LACTATE AND CALCIUM CHLORIDE: 600; 310; 30; 20 INJECTION, SOLUTION INTRAVENOUS at 20:28

## 2023-04-26 RX ADMIN — FENTANYL CITRATE 50 MCG: 50 INJECTION INTRAMUSCULAR; INTRAVENOUS at 12:19

## 2023-04-26 RX ADMIN — PROPOFOL 25 MCG/KG/MIN: 10 INJECTION, EMULSION INTRAVENOUS at 13:15

## 2023-04-26 RX ADMIN — TRANEXAMIC ACID 1950 MG: 650 TABLET ORAL at 10:52

## 2023-04-26 RX ADMIN — PHENYLEPHRINE HYDROCHLORIDE 100 MCG: 10 INJECTION INTRAVENOUS at 13:46

## 2023-04-26 RX ADMIN — BUPIVACAINE HYDROCHLORIDE 12 ML: 2.5 INJECTION, SOLUTION EPIDURAL; INFILTRATION; INTRACAUDAL at 12:21

## 2023-04-26 RX ADMIN — BUPIVACAINE 10 ML: 13.3 INJECTION, SUSPENSION, LIPOSOMAL INFILTRATION at 12:21

## 2023-04-26 RX ADMIN — SODIUM CHLORIDE, POTASSIUM CHLORIDE, SODIUM LACTATE AND CALCIUM CHLORIDE: 600; 310; 30; 20 INJECTION, SOLUTION INTRAVENOUS at 12:14

## 2023-04-26 RX ADMIN — SODIUM CHLORIDE, POTASSIUM CHLORIDE, SODIUM LACTATE AND CALCIUM CHLORIDE: 600; 310; 30; 20 INJECTION, SOLUTION INTRAVENOUS at 18:20

## 2023-04-26 RX ADMIN — METFORMIN ER 500 MG 500 MG: 500 TABLET ORAL at 18:19

## 2023-04-26 RX ADMIN — PHENYLEPHRINE HYDROCHLORIDE 100 MCG: 10 INJECTION INTRAVENOUS at 13:38

## 2023-04-26 RX ADMIN — PROPOFOL 160 MG: 10 INJECTION, EMULSION INTRAVENOUS at 13:06

## 2023-04-26 RX ADMIN — SODIUM CHLORIDE, POTASSIUM CHLORIDE, SODIUM LACTATE AND CALCIUM CHLORIDE: 600; 310; 30; 20 INJECTION, SOLUTION INTRAVENOUS at 14:42

## 2023-04-26 ASSESSMENT — ACTIVITIES OF DAILY LIVING (ADL)
ADLS_ACUITY_SCORE: 20
ADLS_ACUITY_SCORE: 35
ADLS_ACUITY_SCORE: 20
ADLS_ACUITY_SCORE: 35
ADLS_ACUITY_SCORE: 20

## 2023-04-26 NOTE — ANESTHESIA PROCEDURE NOTES
Airway         Procedure Start/Stop Times: 4/26/2023 1:10 PM  Staff -        CRNA: Anna Johnson APRN CRNA       Performed By: CRNAIndications and Patient Condition       Induction type:intravenous       Mask difficulty assessment: 1 - vent by mask    Final Airway Details       Final airway type: endotracheal airway       Successful airway: ETT - single  Endotracheal Airway Details        ETT size (mm): 7.0       Cuffed: yes       Successful intubation technique: direct laryngoscopy       DL Blade Type: MAC 3       Grade View of Cords: 2       Adjucts: stylet       Position: Left       Measured from: lips    Post intubation assessment        Placement verified by: capnometry, equal breath sounds and chest rise        Number of attempts at approach: 1       Secured with: silk tape       Ease of procedure: easy       Dentition: Intact and Unchanged       Dental guard used and removed. Dental Guard Type: Proguard Red.    Medication(s) Administered   Medication Administration Time: 4/26/2023 1:10 PM

## 2023-04-26 NOTE — ANESTHESIA POSTPROCEDURE EVALUATION
Patient: Erin Espinosa    Procedure: Procedure(s):  RIGHT REVERSE TOTAL SHOULDER REPLACEMENT       Anesthesia Type:  General    Note:     Postop Pain Control: Uneventful            Sign Out: Well controlled pain   PONV: No   Neuro/Psych: Uneventful            Sign Out: Acceptable/Baseline neuro status   Airway/Respiratory: Uneventful            Sign Out: Acceptable/Baseline resp. status   CV/Hemodynamics: Uneventful            Sign Out: Acceptable CV status; No obvious hypovolemia; No obvious fluid overload   Other NRE: NONE   DID A NON-ROUTINE EVENT OCCUR? No           Last vitals:  Vitals Value Taken Time   /58 04/26/23 1601   Temp 36.9  C (98.4  F) 04/26/23 1600   Pulse 86 04/26/23 1609   Resp 40 04/26/23 1609   SpO2 90 % 04/26/23 1609   Vitals shown include unvalidated device data.    Electronically Signed By: Guillermo Layton MD  April 26, 2023  4:12 PM

## 2023-04-26 NOTE — INTERVAL H&P NOTE
"I have reviewed the surgical (or preoperative) H&P that is linked to this encounter, and examined the patient. There are no significant changes    Clinical Conditions Present on Arrival:  Clinically Significant Risk Factors Present on Admission                  # Severe Obesity: Estimated body mass index is 40.06 kg/m  as calculated from the following:    Height as of this encounter: 1.549 m (5' 1\").    Weight as of this encounter: 96.2 kg (212 lb).       "

## 2023-04-26 NOTE — OP NOTE
Operative Note    Name:  Erin Espinosa  :  1950  MRN:  [id number]  Procedure Date:  2023    Preoperative Diagnosis:  Right Shoulder DJD and Rotator cuff tear, rotator cuff tear arthropathy    Postoperative Diagnosis:  Same    Procedures:  1.Right Reverse Total Shoulder Arthroplasty  2.Open biceps tenodesis    Surgeon(s):   HARRIETT Meza MD    Asst.   Lorena Beckett PA-C PA-C assistance was required due to the complexity of the procedure, for patient positioning, instrumentation assistance, soft tissue retraction and patient safety.      Circulator: Mindy Gibbs RN  Relief Circulator: Jaky Alanis RN  Relief Scrub: EUGENIO MILES  Scrub Person: London Mosley      Anesthesia:   General and Interscalene block with Exparel     Estimated Blood Loss: 150 cc    Condition on discharge from OR:  stable    Drains: none     Specimens: None    Tissue Removed, Not Sent: Humeral head    Complications: none     Disposition:  Stable and awake to postanesthesia recovery..      INDICATION FOR OPERATION:  Erin Espinosa is a 72 year old female with a history of shoulder pain and stiffness and she has failed nonsurgical treatment. XR showed evidence of severe osteoarthritis of the shoulder.  She has associated rotator cuff insufficiency/tearing.  I recommended she undergo shoulder arthroplasty. Risks and benefits of the planned procedure as well as details of surgery were discussed with the patient. Consent was obtained.     Risk and benefits of the procedure were fully discussed and all questions were answered.  These include but are certainly not limited to infection, intra or postoperative fracture, component loosening, component failure, dislocation, nerve injury, vascular injury, incomplete return of function, incomplete pain relief, stiffness, and need for more surgery in the future.      REPORT OF OPERATION:   The patient was brought to operating room and placed supine on the operating table. An interscalene  block was placed by Anesthesia preoperatively and she was given appropriate preoperative antibiotic. After induction of general anesthesia, she was placed in the beach-chair position on the operating room table. All bony prominences were well padded. The shoulder was prepped and draped in normal sterile fashion.    A standard anterior deltopectoral incision was utilized.  The cephalic vein was taken laterally.  The clavipectoral fascia was incised just lateral to the conjoined tendon.  Deep retractors were placed below the clavipectoral fascia and the deltoid. The humeral head was inspected.  The rotator cuff was noted to have complete tearing with retraction of the entire supraspinatus and infraspinatus.  The teres minor was intact.  The subscapularis had severe attenuation with only a thin sheet remaining attached to the lesser tuberosity.   Biceps was tenodesed to the superior aspect of the pectoralis tendon using #1 Ethibond.  Anterior humeral circumflex vessels were then coagulated with Bovie cautery.  Subscapularis remnant was released off the lesser tuberosity and tagged.    The humeral head was exposed and noted to have a wear pattern of the anterior and mid humeral head with large anterior and inferior humeral osteophytes, which were resected. Humeral head cut was then performed using appropriate instrumentation. The humerus was then reamed and broached, and a trial stem with a head protection plate was placed.    The glenoid was then exposed. The labrum was excised circumferentially.  Inferior capsule release was performed, taking care to identify and protect the axillary nerve.    Next, the glenoid was drilled and reamed and prepared for glenoid implant.  25 standard mm glenoid baseplate was then impacted and the peripheral screws were placed, the inferior-posterior hole did not have good bone and therefore screws not placed in this position.  36 mm standard glenosphere was then impacted onto the  baseplate and the central locking screw was tightened.  The humerus was again exposed.  The cut protector tray was removed.  The humeral tray and trial polyethylene were placed on the trial broach.  The joint was reduced.  There was appropriate tension on the conjoined tendon and deltoid, there was appropriate range of motion, there was no significant gapping of the components with shuck test, lever test, and external rotation with arm at side.  The joint was dislocated.  The humerus was again exposed, trial implants were removed.  Given the severe attenuation of the subscapularis there was not adequate tissue the left for repair.  Size 3 B humeral stem was impacted into place.  36 center +0 mm humeral tray was placed impacted via the Rivers taper.  36+6 polyethylene cup was impacted into place. A final reduction was performed, and the shoulder was copiously irrigated with saline irrigation.   All instruments were removed. The incison was again copiously irrigated with saline irrigation. 1 gram of vancomycin powder was placed in the shoulder joint and subdeltoid space.  The incision was closed in layers with #1 Ethibond closure of the deltopectoral split.  3-0 Vicryl the deep dermal layer.  4 Monocryl for subcutaneous running closure.  Surgical glue was applied to the wound. A sterile dressing was placed on the shoulder.     Postoperatively she was placed in a shoulder SlingShot brace and  transferred in stable, awake condition to Postanesthesia Recovery.  Following surgery she will be maintained in the sling for 4-6 weeks postoperatively, may discontinue abduction pillow at 1-2 week postoperative and begin PT at approximately 3 weeks postoperatively.      Postop plan  Admitted overnight-  [facility] for perioperative prophylactic  Aspirin for DVT, she is already taking 325 mg of aspirin daily  Oxycodone, Tylenol, hydroxyzine for pain control  Once block wears off she can start shoulder pendulum exercises as well as elbow  wrist and hand range of motion exercises  Sling at all times except for hygiene and range of motion exercises.  I will see her back in clinic for postoperative follow-up in 2 weeks.    Juanito Meza MD, M.D.   Date: 4/26/2023  Time: 3:28 PM    Implants:  Implant Name Type Inv. Item Serial No.  Lot No. LRB No. Used Action   BASEPLATE STD 25MM - [id number] Total Joint Component/Insert BASEPLATE STD 25MM [id number] [employer] 0 Right 1 Implanted   GLEOSPHERE LATERALIZED STANDARD 36MM - [id number] Total Joint Component/Insert GLEOSPHERE LATERALIZED STANDARD 36MM [id number] [employer] 0 Right 1 Implanted   SCREW CENTRAL 6.5X40MM [id number] - [id number] Metallic Hardware/[place] SCREW CENTRAL 6.5X40MM [id number]  [employer] 0 Right 1 Implanted   SCREW PERIPHERAL 26MM - [id number] Metallic Hardware/[place] SCREW PERIPHERAL 26MM  [employer] 0 Right 1 Implanted   SCREW PERIPHERAL 5.0X30MM [id number] - [id number] Metallic Hardware/[place] SCREW PERIPHERAL 5.0X30MM [id number]  [employer] 0 Right 1 Implanted   SCREW PERIPHERAL 14MM [id number] - [id number] Metallic Hardware/[place] SCREW PERIPHERAL 14MM [id number]  [employer] 0 Right 1 Implanted   INSERT REVISION REVERSE +6/12 36MM - [id number] Total Joint Component/Insert INSERT REVISION REVERSE +6/12 36MM [id number] [employer] 0 Right 1 Implanted   TRAY REVERSE CENTERED +0 - [id number] Total Joint Component/Insert TRAY REVERSE CENTERED +0 [id number] [employer] 0 Right 1 Implanted   STEM HUMERAL ANATOMIC STD PTC 3B - [id number] Total Joint Component/Insert STEM HUMERAL ANATOMIC STD PTC 3B [id number] [employer] 0 Right 1 Implanted

## 2023-04-26 NOTE — PHARMACY-ADMISSION MEDICATION HISTORY
Pharmacist Admission Medication History    Admission medication history is complete. The information provided in this note is only as accurate as the sources available at the time of the update.    Medication reconciliation/reorder completed by provider prior to medication history? Yes    Information Source(s): Patient and CareEverywhere/SureScripts via N/A    Pertinent Information: Utilized pt's provided home medication list.    Changes made to PTA medication list:    Added: None    Deleted: None    Changed: None      Allergies reviewed with patient and updates made in EHR: yes    Medication History Completed By: Lanie Gomez PharmD 4/26/2023 10:57 AM    Prior to Admission medications    Medication Sig Last Dose Taking? Auth Provider Long Term End Date   aspirin (ASA) 325 MG EC tablet Take 325 mg by mouth daily 3/31/2023 Yes Reported, Patient     atorvastatin (LIPITOR) 40 MG tablet Take 40 mg by mouth At Bedtime 4/25/2023 Yes Reported, Patient Yes    calcium carbonate-vitamin D (CALTRATE) 600-10 MG-MCG per tablet Take 1 tablet by mouth daily Unknown Yes Unknown, Entered By History     lisinopril (ZESTRIL) 10 MG tablet Take 10 mg by mouth daily 4/25/2023 Yes Reported, Patient Yes    metFORMIN (GLUCOPHAGE XR) 500 MG 24 hr tablet Take 1 tablet by mouth daily 4/25/2023 Yes Reported, Patient Yes    Multiple Vitamins-Minerals (PRESERVISION AREDS 2+MULTI VIT PO) Take 1 tablet by mouth daily Unknown Yes Unknown, Entered By History

## 2023-04-26 NOTE — BRIEF OP NOTE
[facility]    Brief Operative Note    Pre-operative diagnosis: Rotator cuff tear arthropathy, right  Post-operative diagnosis Same as pre-operative diagnosis    Procedure: Procedure(s):  RIGHT REVERSE TOTAL SHOULDER REPLACEMENT  Surgeon: Surgeon(s) and Role:     * Juanito Meza MD - Primary     * Lorena Bekcett PA-C - Assisting  Anesthesia: General with Block   Estimated Blood Loss: 150 cc    Drains: None  Specimens: * No specimens in log *  Findings:   Deficient rotator cuff including chronic retracted tears of supraspinatus and infraspinatus and severe attenuation of subscapularis  Complications: None.  Implants:   Implant Name Type Inv. Item Serial No.  Lot No. LRB No. Used Action   BASEPLATE STD 25MM - [id number] Total Joint Component/Insert BASEPLATE STD 25MM [id number] [employer] 0 Right 1 Implanted   GLEOSPHERE LATERALIZED STANDARD 36MM - [id number] Total Joint Component/Insert GLEOSPHERE LATERALIZED STANDARD 36MM [id number] [employer] 0 Right 1 Implanted   SCREW CENTRAL 6.5X40MM [id number] - [id number] Metallic Hardware/[place] SCREW CENTRAL 6.5X40MM [id number]  [employer] 0 Right 1 Implanted   SCREW PERIPHERAL 26MM - [id number] Metallic Hardware/[place] SCREW PERIPHERAL 26MM  [employer] 0 Right 1 Implanted   SCREW PERIPHERAL 5.0X30MM [id number] - [id number] Metallic Hardware/[place] SCREW PERIPHERAL 5.0X30MM [id number]  [employer] 0 Right 1 Implanted   SCREW PERIPHERAL 14MM [id number] - [id number] Metallic Hardware/[place] SCREW PERIPHERAL 14MM [id number]  [employer] 0 Right 1 Implanted   INSERT REVISION REVERSE +6/12 36MM - [id number] Total Joint Component/Insert INSERT REVISION REVERSE +6/12 36MM [id number] [employer] 0 Right 1 Implanted   TRAY REVERSE CENTERED +0 - [id number] Total Joint Component/Insert TRAY REVERSE CENTERED +0 [id number] [employer] 0 Right 1 Implanted   STEM HUMERAL ANATOMIC STD PTC 3B - [id number] Total Joint Component/Insert STEM HUMERAL  ANATOMIC STD PTC 3B [id number] [employer] 0 Right 1 Implanted

## 2023-04-26 NOTE — ANESTHESIA PROCEDURE NOTES
Interscalene Procedure Note    Pre-Procedure   Staff -        Anesthesiologist:  Guillermo Layton MD       Performed By: anesthesiologist       Location: pre-op       Procedure Start/Stop Times: 4/26/2023 12:19 PM and 4/26/2023 12:21 PM       Pre-Anesthestic Checklist: patient identified, IV checked, site marked, risks and benefits discussed, informed consent, monitors and equipment checked, pre-op evaluation, at physician/surgeon's request and post-op pain management  Timeout:       Correct Patient: Yes        Correct Procedure: Yes        Correct Site: Yes        Correct Position: Yes        Correct Laterality: Yes        Site Marked: Yes  Procedure Documentation  Procedure: Interscalene       Laterality: right       Patient Position: supine       Patient Prep/Sterile Barriers: sterile gloves, mask       Skin prep: Chloraprep       Needle Type: short bevel       Needle Gauge: 22.        Needle Length (Inches): 2        Ultrasound guided       1. Ultrasound was used to identify targeted nerve, plexus, vascular marker, or fascial plane and place a needle adjacent to it in real-time.       2. Ultrasound was used to visualize the spread of anesthetic in close proximity to the above referenced structure.       3. A permanent image is entered into the patient's record.       4. The visualized anatomic structures appeared normal.       5. There were no apparent abnormal pathologic findings.    Assessment/Narrative         The placement was negative for: blood aspirated, painful injection and site bleeding       Paresthesias: No.       Bolus given via needle. no blood aspirated via catheter.        Secured via.        Insertion/Infusion Method: Single Shot       Complications: none    Medication(s) Administered   Bupivacaine 0.25% PF (Infiltration) - Infiltration   12 mL - 4/26/2023 12:21:00 PM  Bupivacaine liposome (Exparel) 1.3% LA inj susp (Infiltration) - Infiltration   10 mL - 4/26/2023 12:21:00 PM  Medication  "Administration Time: 4/26/2023 12:19 PM      FOR [facility] ONLY:   Pain Team Contact information: please page the Pain Team Via ONTRAPORT. Search \"Pain\". During daytime hours, please page the attending first. At night please page the resident first.      "

## 2023-04-26 NOTE — ANESTHESIA PROCEDURE NOTES
Other (superficial cervical plexus) Procedure Note    Pre-Procedure   Staff -        Anesthesiologist:  Guillermo Layton MD       Performed By: anesthesiologist       Location: pre-op       Procedure Start/Stop Times: 4/26/2023 12:21 PM and 4/26/2023 12:23 PM       Pre-Anesthestic Checklist: patient identified, IV checked, site marked, risks and benefits discussed, informed consent, monitors and equipment checked, pre-op evaluation, at physician/surgeon's request and post-op pain management  Timeout:       Correct Patient: Yes        Correct Procedure: Yes        Correct Site: Yes        Correct Position: Yes        Correct Laterality: Yes        Site Marked: Yes  Procedure Documentation  Procedure: Other (superficial cervical plexus)       Laterality: right       Patient Position: supine       Patient Prep/Sterile Barriers: sterile gloves, mask       Skin prep: Chloraprep       Needle Type: short bevel       Needle Gauge: 22.        Needle Length (Inches): 2        Ultrasound guided       1. Ultrasound was used to identify targeted nerve, plexus, vascular marker, or fascial plane and place a needle adjacent to it in real-time.       2. Ultrasound was used to visualize the spread of anesthetic in close proximity to the above referenced structure.       3. A permanent image is entered into the patient's record.       4. The visualized anatomic structures appeared normal.       5. There were no apparent abnormal pathologic findings.    Assessment/Narrative         The placement was negative for: blood aspirated, painful injection and site bleeding       Paresthesias: No.       Bolus given via needle. no blood aspirated via catheter.        Secured via.        Insertion/Infusion Method: Single Shot       Complications: none    Medication(s) Administered   Bupivacaine 0.25% PF (Infiltration) - Infiltration   3 mL - 4/26/2023 12:23:00 PM  Medication Administration Time: 4/26/2023 12:21 PM      FOR [facility] ([facility]  "Bank) ONLY:   Pain Team Contact information: please page the Pain Team Via [facility]. Search \"Pain\". During daytime hours, please page the attending first. At night please page the resident first.      "

## 2023-04-26 NOTE — ANESTHESIA PREPROCEDURE EVALUATION
Anesthesia Pre-Procedure Evaluation    Patient: Erin Espinosa   MRN: [id number] : 1950        Procedure : Procedure(s):  RIGHT REVERSE TOTAL SHOULDER REPLACEMENT          Past Medical History:   Diagnosis Date     Diabetes (H)      Hypertension      PONV (postoperative nausea and vomiting)       Past Surgical History:   Procedure Laterality Date     ARTHROSCOPY SHOULDER ROTATOR CUFF REPAIR  2016     CATARACT EXTRACTION Bilateral      CERVICAL FUSION   C4 C7 decompressive laminectomy,  C5-C6 and C6-C7 anterior cervical discectomy and fusion     DILATION AND CURETTAGE       EYE SURGERY      Glaucoma surgery     OTHER SURGICAL HISTORY      epidural abscessdrain placed     POSTERIOR LAMINECTOMY / DECOMPRESSION LUMBAR SPINE       redo L3-L4 laminectomy and microdiscectomy, 12/10 L5-S1 hemilaminectomy,  L2 L5 decompressive laminectomy, 3/31/06 redo L2-L3 discectomy, 3/13/06 L2-L3 discectomy     RELEASE CARPAL TUNNEL       REPAIR BLADDER      sling procedure     TONSILLECTOMY       TOTAL KNEE ARTHROPLASTY Left      ZZC TOTAL KNEE ARTHROPLASTY Right 2015    Procedure: TOTAL KNEE ARTHROPLASTY, RIGHT;  Surgeon: Kenny García MD;  Location: [facility] OR;  Service: Orthopedics      Allergies   Allergen Reactions     Ketorolac GI Disturbance     Sulfa Antibiotics Hives      Social History     Tobacco Use     Smoking status: Never     Smokeless tobacco: Never   Vaping Use     Vaping status: Not on file   Substance Use Topics     Alcohol use: Yes     Alcohol/week: 0.0 standard drinks of alcohol     Comment: 0-1 drink per month      Wt Readings from Last 1 Encounters:   23 96.2 kg (212 lb)        Anesthesia Evaluation   Pt has had prior anesthetic.     History of anesthetic complications  - PONV.  emergence agitation.    ROS/MED HX  ENT/Pulmonary:  - neg pulmonary ROS     Neurologic: Comment: Hx of cervical spine surgery, C4-7 lami, C5-7 ACDF. Pt reports  having ongoing numbness in fingers of right hand which she attributes to complications from a different surgical procedure      Cardiovascular:     (+) hypertension-----    METS/Exercise Tolerance:     Hematologic:  - neg hematologic  ROS     Musculoskeletal:       GI/Hepatic:       Renal/Genitourinary:       Endo:     (+) type II DM, Obesity (BMI>40),     Psychiatric/Substance Use:       Infectious Disease:       Malignancy:       Other:            Physical Exam    Airway        Mallampati: II   TM distance: > 3 FB   Neck ROM: full   Mouth opening: > 3 cm    Respiratory Devices and Support         Dental       (+) Minor Abnormalities - some fillings, tiny chips      Cardiovascular          Rhythm and rate: regular and normal     Pulmonary           breath sounds clear to auscultation           OUTSIDE LABS:  CBC:   Lab Results   Component Value Date    HGB 16.1 (H) 07/24/2018    HGB 13.3 06/28/2014     BMP:   Lab Results   Component Value Date     07/24/2018    POTASSIUM 4.3 07/24/2018    CHLORIDE 106 07/24/2018    CO2 26 07/24/2018    BUN 21 07/24/2018    CR 0.84 07/24/2018     (H) 04/26/2023     (H) 07/24/2018     COAGS: No results found for: PTT, INR, FIBR  POC: No results found for: BGM, HCG, HCGS  HEPATIC:   Lab Results   Component Value Date    ALBUMIN 4.1 07/24/2018    PROTTOTAL 7.4 07/24/2018    ALT 61 (H) 07/24/2018    AST 48 (H) 07/24/2018    ALKPHOS 132 (H) 07/24/2018    BILITOTAL 0.9 07/24/2018     OTHER:   Lab Results   Component Value Date    A1C 10.4 (H) 07/24/2018    SHANTE 10.4 07/24/2018       Anesthesia Plan    ASA Status:  3   NPO Status:  NPO Appropriate    Anesthesia Type: General.     - Airway: ETT   Induction: Intravenous.   Maintenance: TIVA.        Consents    Anesthesia Plan(s) and associated risks, benefits, and realistic alternatives discussed. Questions answered and patient/representative(s) expressed understanding.     - Discussed: Risks, Benefits and Alternatives for  the PROCEDURE were discussed     - Discussed with:  Patient         Postoperative Care    Pain management: IV analgesics, Oral pain medications, Peripheral nerve block (Single Shot).   PONV prophylaxis: Ondansetron (or other 5HT-3), Dexamethasone or Solumedrol     Comments:    Other Comments: Discussed the possibility of worsening pre-existing peripheral nerve symptoms in right upper extremity after getting brachial plexus block. Pt understands and wishes to proceed.            Guillermo Layton MD

## 2023-04-26 NOTE — DISCHARGE INSTRUCTIONS
Remain in sling at all times except hygiene, shoulder pendulums, and elbow/hand/wrist ROM exercises.  No weight bearing.  No lifting, pushing, or pulling.   Aspirin for DVT prophylaxis (to prevent blood clots)  Okay to shower with dressing on after post op day #3  Remove surgical dressing on post op day #7-10  Leave surgical glue in place until it falls off on its own  Do NOT submerse the wound in water until seen for post-op appointment.     You already have a post-op appt schedule for ~2 weeks from now.  For any questions please don't hesitate to call  [facility]  Tem Meza Voicemail: [phone]  Appointment Line: [phone]

## 2023-04-26 NOTE — ANESTHESIA CARE TRANSFER NOTE
Patient: Erin Espinosa    Procedure: Procedure(s):  RIGHT REVERSE TOTAL SHOULDER REPLACEMENT       Diagnosis: Shoulder pain [M25.519]  Diagnosis Additional Information: No value filed.    Anesthesia Type:   General     Note:    Oropharynx: oropharynx clear of all foreign objects  Level of Consciousness: drowsy  Oxygen Supplementation: face mask  Level of Supplemental Oxygen (L/min / FiO2): 5  Independent Airway: airway patency satisfactory and stable  Dentition: dentition unchanged  Vital Signs Stable: post-procedure vital signs reviewed and stable  Report to RN Given: handoff report given  Patient transferred to: PACU    Handoff Report: Identifed the Patient, Identified the Reponsible Provider, Reviewed the pertinent medical history, Discussed the surgical course, Reviewed Intra-OP anesthesia mangement and issues during anesthesia, Set expectations for post-procedure period and Allowed opportunity for questions and acknowledgement of understanding      Vitals:  Vitals Value Taken Time   /72 04/26/23 1541   Temp 36.9  C (98.4  F) 04/26/23 1540   Pulse 86 04/26/23 1541   Resp 22 04/26/23 1541   SpO2 93 % 04/26/23 1541   Vitals shown include unvalidated device data.    Electronically Signed By: LEFTY KOTHARI CRNA  April 26, 2023  3:43 PM

## 2023-04-27 ENCOUNTER — APPOINTMENT (OUTPATIENT)
Dept: OCCUPATIONAL THERAPY | Facility: CLINIC | Age: 73
End: 2023-04-27
Attending: ORTHOPAEDIC SURGERY
Payer: MEDICARE

## 2023-04-27 VITALS
HEIGHT: 61 IN | HEART RATE: 88 BPM | SYSTOLIC BLOOD PRESSURE: 122 MMHG | TEMPERATURE: 98 F | DIASTOLIC BLOOD PRESSURE: 75 MMHG | OXYGEN SATURATION: 95 % | RESPIRATION RATE: 18 BRPM | WEIGHT: 212 LBS | BODY MASS INDEX: 40.02 KG/M2

## 2023-04-27 LAB
FASTING STATUS PATIENT QL REPORTED: NO
GLUCOSE BLD-MCNC: 164 MG/DL (ref 70–125)
HGB BLD-MCNC: 12.5 G/DL (ref 11.7–15.7)

## 2023-04-27 PROCEDURE — 82947 ASSAY GLUCOSE BLOOD QUANT: CPT | Performed by: ORTHOPAEDIC SURGERY

## 2023-04-27 PROCEDURE — 97166 OT EVAL MOD COMPLEX 45 MIN: CPT | Mod: GO

## 2023-04-27 PROCEDURE — 36415 COLL VENOUS BLD VENIPUNCTURE: CPT | Performed by: ORTHOPAEDIC SURGERY

## 2023-04-27 PROCEDURE — 97110 THERAPEUTIC EXERCISES: CPT | Mod: GO

## 2023-04-27 PROCEDURE — 97535 SELF CARE MNGMENT TRAINING: CPT | Mod: GO

## 2023-04-27 PROCEDURE — 85018 HEMOGLOBIN: CPT | Performed by: ORTHOPAEDIC SURGERY

## 2023-04-27 PROCEDURE — 250N000011 HC RX IP 250 OP 636: Performed by: ORTHOPAEDIC SURGERY

## 2023-04-27 PROCEDURE — 250N000013 HC RX MED GY IP 250 OP 250 PS 637: Performed by: ORTHOPAEDIC SURGERY

## 2023-04-27 RX ORDER — AMOXICILLIN 250 MG
1 CAPSULE ORAL 2 TIMES DAILY
Qty: 50 TABLET | Refills: 0 | Status: SHIPPED | OUTPATIENT
Start: 2023-04-27

## 2023-04-27 RX ADMIN — METFORMIN ER 500 MG 500 MG: 500 TABLET ORAL at 08:52

## 2023-04-27 RX ADMIN — CEFAZOLIN SODIUM 2 G: 2 INJECTION, SOLUTION INTRAVENOUS at 04:22

## 2023-04-27 RX ADMIN — ACETAMINOPHEN 975 MG: 325 TABLET ORAL at 00:34

## 2023-04-27 RX ADMIN — ASPIRIN 325 MG: 325 TABLET, DELAYED RELEASE ORAL at 08:52

## 2023-04-27 RX ADMIN — LISINOPRIL 10 MG: 10 TABLET ORAL at 08:52

## 2023-04-27 RX ADMIN — ACETAMINOPHEN 975 MG: 325 TABLET ORAL at 08:52

## 2023-04-27 ASSESSMENT — ACTIVITIES OF DAILY LIVING (ADL)
ADLS_ACUITY_SCORE: 20

## 2023-04-27 NOTE — DISCHARGE SUMMARY
"ORTHOPEDIC DISCHARGE SUMMARY       Erin Espinosa,  1950, MRN [id number]    Admission Date: 2023      Admission Diagnoses: Shoulder pain [M25.519]  S/P reverse total shoulder arthroplasty, right [Z96.611]     Discharge Date:  2023    Post-operative Day:  1 Day Post-Op    Reason for Admission: The patient was admitted for the following: Procedure(s):  RIGHT REVERSE TOTAL SHOULDER REPLACEMENT    BRIEF HOSPITAL COURSE   Erin Espinosa is a pleasant 72 year old female who underwent the aforementioned procedure with Dr. Meza on 2023. There were no intraoperative complications and the patient was transferred to the recovery room and later the orthopedic unit in stable condition. Once the patient reached the orthopedic floor our orthopedic pain protocol was implemented along with the following:    Anticoagulation Medications: ASA  Therapy: OT  Activity: NWB TOYAE  Bracing: Slingshot Brace    Consultations during Admission: Hospitalist service for medical management     COMPLICATIONS/SIGNIFICANT FINDINGS    None    DISCHARGE INFORMATION   Condition at discharge: Good  Discharge destination: Home  Patient was seen by myself on the date of discharge.    FOLLOW UP CARE   Follow up with orthopedics in 2 weeks or sooner should the need arise. Ortho will continue to manage pain control, post op anticoagulation and incision care.     Follow up with your PCP for management of chronic medical problems and to evaluate for post op medical complications including constipation, nausea/vomiting, DVT/PE, anemia, changes in blood pressure, fevers/chills, urinary retention and atelectasis/pneumonia.     Subjective   Patient is doing well on POD #1. Pain is well controlled with oral medications. Ambulating. Tolerating oral intake.     Physical Exam   /75 (BP Location: Left arm)   Pulse 88   Temp 98  F (36.7  C) (Oral)   Resp 18   Ht 1.549 m (5' 1\")   Wt 96.2 kg (212 lb)   SpO2 95%   BMI 40.06 kg/m    The " patient is A&Ox3. Appears comfortable, sitting up at bedside & dressed. Wearing sling appropriately.  Sensation is not intact in right upper extremity, hand & fingers due to block.  Strength and motor not evaluated on the RUE due to block still being in effect. Able to gently wiggle fingers.  Palpable right radial pulse. Hand warm with brisk cap refill in fingers.  Calves are soft and non-tender.   Mild edema & ecchymosis of right shoulder. The incision is covered. Dressing C/D/I.     Pertinent Results at Discharge     Hemoglobin   Date/Time Value Ref Range Status   04/27/2023 07:41 AM 12.5 11.7 - 15.7 g/dL Final   04/26/2023 11:50 AM 13.2 11.7 - 15.7 g/dL Final   07/24/2018 02:33 PM 16.1 (H) 12.0 - 16.0 g/dL Final   06/28/2014 11:31 AM 13.3 11.7 - 15.7 g/dL Final     Platelet Count   Date/Time Value Ref Range Status   04/26/2023 11:50  150 - 450 10e3/uL Final       Problem List   Principal Problem:    S/P reverse total shoulder arthroplasty, right      Mercedez Nugent PA-C/Dr. Meza  [facility]  [phone]  Date: 4/27/2023  Time: 11:12 AM

## 2023-04-27 NOTE — PLAN OF CARE
Patient vital signs are at baseline: Yes  Patient able to ambulate as they were prior to admission or with assist devices provided by therapies during their stay:  Yes  Patient MUST void prior to discharge:  Yes  Patient able to tolerate oral intake:  Yes  Pain has adequate pain control using Oral analgesics:  Yes  Does patient have an identified :  Yes  Has goal D/C date and time been discussed with patient:  Yes    Pt is A&Ox4. Pt rated pain 0-1/10 during shift thus far. Managed with scheduled medications, ice, repositioning, and rest. No new skin issues noted. Dressing is CDI. Numbness still present in R hand and fingers. SBA for transferring and ambulating. Voiding adequately. Education on medication administration, alarms, and use of call-light to reduce risk for falls and injury. No further issues noted. CMS intact. VSS other than one soft bp (did not meet parameters to notify provider), no shortness of breath or nausea.

## 2023-04-27 NOTE — PROGRESS NOTES
Care Management Follow Up    Length of Stay (days): 0    Expected Discharge Date: 04/27/2023     Concerns to be Addressed:       Patient plan of care discussed at interdisciplinary rounds: Yes    Anticipated Discharge Disposition:  Home      Additional Information:  Chart reviewed, plan home, pt lives alone.  Neighbors will assist pt as needed.  Care Management available if any discharge needs, no CM needs indicated at this time.      PAM Farris

## 2023-04-27 NOTE — PROGRESS NOTES
04/27/23 0744   Appointment Info   Signing Clinician's Name / Credentials (OT) VALENTE Lei   Living Environment   People in Home alone   Current Living Arrangements house   Home Accessibility stairs to enter home;stairs within home   Number of Stairs, Main Entrance 4   Stair Railings, Main Entrance railings safe and in good condition;railings on both sides of stairs   Number of Stairs, Within Home, Primary greater than 10 stairs   Stair Railings, Within Home, Primary railings safe and in good condition;railings on both sides of stairs   Transportation Anticipated family or friend will provide   Living Environment Comments No ADs. Pt lives in 2 story home but does not need to use basement at all. Pt has tub shower w/ shower chair and grab bar, comfort height toilet. Pt will have multiple neighbors available to assist if needed w/ ADLs and IADLs   Self-Care   Usual Activity Tolerance good   Current Activity Tolerance good   Equipment Currently Used at Home grab bar, tub/shower   Fall history within last six months yes   Number of times patient has fallen within last six months 1   Activity/Exercise/Self-Care Comment Pt IND w/ all ADLs and IADLs.   General Information   Onset of Illness/Injury or Date of Surgery 04/26/23   Referring Physician Juanito Meza MD   Patient/Family Therapy Goal Statement (OT) go home today   Additional Occupational Profile Info/Pertinent History of Current Problem s/ p R reverse TSA   Existing Precautions/Restrictions shoulder   Right Upper Extremity (Weight-bearing Status) (S)  non weight-bearing (NWB)   Cognitive Status Examination   Orientation Status orientation to person, place and time   Sensory   Sensory Comments numbness in RUE post op   Pain Assessment   Patient Currently in Pain No   Posture   Posture not impaired   Range of Motion Comprehensive   Comment, General Range of Motion no AROM in RUE post op   Strength Comprehensive (MMT)   General Manual Muscle  Testing (MMT) Assessment no strength deficits identified   Bed Mobility   Bed Mobility supine-sit   Comment (Bed Mobility) SBA for bed mobility   Transfers   Transfers shower transfer;toilet transfer   Transfer Comments SBA-CGA for transfers   Activities of Daily Living   BADL Assessment/Intervention bathing;upper body dressing;lower body dressing   Bathing Assessment/Intervention   [place] Level (Bathing) contact guard assist   Upper Body Dressing Assessment/Training   [place] Level (Upper Body Dressing) moderate assist (50% patient effort)   Lower Body Dressing Assessment/Training   [place] Level (Lower Body Dressing) minimum assist (75% patient effort)   Clinical Impression   Criteria for Skilled Therapeutic Interventions Met (OT) Yes, treatment indicated   OT Diagnosis decreased ADLs   Influenced by the following impairments R reverse TSA   OT Problem List-Impairments impacting ADL activity tolerance impaired;sensation;post-surgical precautions;range of motion (ROM)   Assessment of Occupational Performance 3-5 Performance Deficits   Identified Performance Deficits dressing, bed mobility, tub transfer, bathing   Planned Therapy Interventions (OT) ADL retraining;bed mobility training;ROM;transfer training   Clinical Decision Making Complexity (OT) moderate complexity   Risk & Benefits of therapy have been explained evaluation/treatment results reviewed;patient   OT Total Evaluation Time   OT Eval, Moderate Complexity Minutes (99209) 10   OT Goals   Therapy Frequency (OT) One time eval and treatment   OT Predicted Duration/Target Date for Goal Attainment 04/27/23   OT Goals Upper Body Dressing;Lower Body Dressing;Bed Mobility;Transfers   OT: Upper Body Dressing Modified independent;within precautions;Goal Met;Completed   OT: Lower Body Dressing Modified independent;within precautions;Goal Met;Completed   OT: Bed Mobility Modified independent;supine to/from sitting;rolling;bridging;within  precautions;Goal Met;Completed   OT: Transfer Modified independent;within precautions;Goal Met;Completed  (tub shower)   Interventions   Interventions Quick Adds Self-Care/Home Management;Therapeutic Procedures/Exercise   Self-Care/Home Management   Self-Care/Home Mgmt/ADL, Compensatory, Meal Prep Minutes (29460) 16   Symptoms Noted During/After Treatment (Meal Preparation/Planning Training) none   Treatment Detail/Skilled Intervention Pt edu on shoulder px - pt verbalized understanding  and needing 2 cues during session. Pt edu on FB dressing including donning/doffing immobilizer - completed w/ Min A due to full numbness in RUE. Anticipate pt will be Mod I once sensation returns. Edu handout given for compensatory strategies for UB dressing. Pt instructed on bed mobility techniques - completed Mod I. STS IND and amb. 150 ftx2, no AD and no LOB. Pt edu on safety technique for stairs using railing on L side and side stepping down stairs - completed IND. Pt edu on safe tub shower transfer -completed Mod I. Pt ended session seated EOB and setup for breakfast.   Therapeutic Procedures/Exercise   Therapeutic Procedure: strength, endurance, ROM, flexibillity minutes (63642) 8   Symptoms Noted During/After Treatment none   Treatment Detail/Skilled Intervention Pt given edu handout on pendulum/UE exercises for RUE. Pt instructed on and completed 1x10 reps of pendelums, elbow flex/ext, supination/pronation, wrist flex/ext, ulnar/radial deviation, and fist pumps. Pt IND w/ HEP after initial cueing. Pt completed exercises using LUE due to numbness in RUE. Instructed to start exercises at home once sensation returns to RUE. Instructed to complete exercises 2x per day at home.   OT Discharge Planning   OT Plan DC OT   OT Discharge Recommendation (DC Rec)   (defer to ortho team)   OT Rationale for DC Rec Pt tolerating therapy well. RUE still numb. Pt moving well and has good setup at home. Pt will have neighbors to assist as  needed w/ ADLs and IADLs. Pt has all necessary DME.   OT Brief overview of current status Mod I for ADLs   Total Session Time   Timed Code Treatment Minutes 24   Total Session Time (sum of timed and untimed services) 34

## 2023-04-27 NOTE — PROGRESS NOTES
"Orthopedic Progress Note      Assessment: 1 Day Post-Op  S/P Procedure(s):  RIGHT REVERSE TOTAL SHOULDER REPLACEMENT  Done by Dr. Meza on 04/26/2023    Plan:   - Continue OT.  - Weightbearing status: NWB right Upper Extremity  - Continue sling & abduction pillow  - Anticoagulation: Aspirin 325 mg daily x30 days, in addition to SCDs, sarai stockings and early ambulation.  - Discharge planning: Discharge to home today.    Subjective:  Pain: Right shoulder pain well controlled on oral medications.Nausea, Vomiting:  No  Chest pain: No  Lightheadedness, Dizziness:  No  Neuro: Block in effect    Patient is doing well on POD #1. She is resting up in bed. States she has no pain, however block continues to be in effect. Tolerating oral intake, voiding & ambulating. Does have questions about what was done during the procedure. Denies fever, chills, N/V, chest pain, SOB, dizziness. Ready for discharge. Hgb 12.5 (pre-op 13.2).    Objective:  /75   Pulse 91   Temp 97.7  F (36.5  C) (Oral)   Resp 16   Ht 1.549 m (5' 1\")   Wt 96.2 kg (212 lb)   SpO2 95%   BMI 40.06 kg/m    The patient is A&Ox3. Appears comfortable, sitting up at bedside & dressed. Wearing sling appropriately.  Sensation is not intact in right upper extremity, hand & fingers due to block.  Strength and motor not evaluated on the RUE due to block still being in effect. Able to gently wiggle fingers.  Palpable right radial pulse. Hand warm with brisk cap refill in fingers.  Calves are soft and non-tender.   Mild edema & ecchymosis of right shoulder. The incision is covered. Dressing C/D/I.     Pertinent Labs   Lab Results: personally reviewed.   No results found for: INR, PROTIME  Lab Results   Component Value Date    WBC 7.7 04/26/2023    HGB 12.5 04/27/2023    HCT 40.1 04/26/2023    MCV 86 04/26/2023     04/26/2023     Lab Results   Component Value Date     07/24/2018    CO2 26 07/24/2018         Report completed by:  Mercedez" TABITHA Nugent/Dr. Meza.  [facility]    Date: 4/27/2023  Time: 9:19 AM

## 2023-04-27 NOTE — PLAN OF CARE
"  Problem: Plan of Care - These are the overarching goals to be used throughout the patient stay.    Goal: Plan of Care Review  Description: The Plan of Care Review/Shift note should be completed every shift.  The Outcome Evaluation is a brief statement about your assessment that the patient is improving, declining, or no change.  This information will be displayed automatically on your shift note.  Outcome: Adequate for Care Transition  Goal: Patient-Specific Goal (Individualized)  Description: You can add care plan individualizations to a care plan. Examples of Individualization might be:  \"Parent requests to be called daily at 9am for status\", \"I have a hard time hearing out of my right ear\", or \"Do not touch me to wake me up as it startles me\".  Outcome: Adequate for Care Transition  Goal: Absence of Hospital-Acquired Illness or Injury  Outcome: Adequate for Care Transition  Intervention: Identify and Manage Fall Risk  Recent Flowsheet Documentation  Taken 4/27/2023 0745 by Macarena Rangel RN  Safety Promotion/Fall Prevention:    activity supervised    assistive device/personal items within reach    clutter free environment maintained    lighting adjusted    nonskid shoes/slippers when out of bed    patient and family education    room near nurse's station    safety round/check completed  Intervention: Prevent Skin Injury  Recent Flowsheet Documentation  Taken 4/27/2023 0745 by Macarena Rangel RN  Body Position: position changed independently  Intervention: Prevent and Manage VTE (Venous Thromboembolism) Risk  Recent Flowsheet Documentation  Taken 4/27/2023 0745 by Macarena Rangel, RN  VTE Prevention/Management: SCDs (sequential compression devices) off  Goal: Optimal Comfort and Wellbeing  Outcome: Adequate for Care Transition  Goal: Readiness for Transition of Care  Outcome: Adequate for Care Transition   Goal Outcome Evaluation: Patient met goals for discharge. AVS reviewed with patient. Friend to transport " home.

## 2023-04-27 NOTE — PLAN OF CARE
Occupational Therapy Discharge Summary    Reason for therapy discharge:    All goals and outcomes met, no further needs identified.    Progress towards therapy goal(s). See goals on Care Plan in [facility] electronic health record for goal details.  Goals met    Therapy recommendation(s):    No further therapy is recommended.

## 2023-04-27 NOTE — PROGRESS NOTES
Pt alert and oriented x 4. Lung sounds clear. Has nonproductive congestive cough, RAVI; sat in mid 90s. Reports mild pain to RUE. NWB to extremity. Immobilizer don. Pain being controlled with ice and schedule pain meds. Will continue to monitor and intervene as needed. Voiding spontaneously. Ambulation goal met. Will discharge home with family for transport pending PT/OT eval and ortho clearance.      Patient vital signs are at baseline: Yes  Patient able to ambulate as they were prior to admission or with assist devices provided by therapies during their stay:  Yes  Patient MUST void prior to discharge:  Yes  Patient able to tolerate oral intake:  Yes  Pain has adequate pain control using Oral analgesics:  Yes  Does patient have an identified :  Yes  Has goal D/C date and time been discussed with patient:  Yes

## 2023-05-04 ENCOUNTER — DOCUMENTATION ONLY (OUTPATIENT)
Dept: OTHER | Facility: CLINIC | Age: 73
End: 2023-05-04
Payer: MEDICARE

## 2023-10-28 ENCOUNTER — HEALTH MAINTENANCE LETTER (OUTPATIENT)
Age: 73
End: 2023-10-28

## 2023-11-30 ENCOUNTER — TRANSFERRED RECORDS (OUTPATIENT)
Dept: HEALTH INFORMATION MANAGEMENT | Facility: CLINIC | Age: 73
End: 2023-11-30
Payer: MEDICARE

## 2024-12-11 ENCOUNTER — OFFICE VISIT (OUTPATIENT)
Dept: URGENT CARE | Facility: URGENT CARE | Age: 74
End: 2024-12-11
Payer: MEDICARE

## 2024-12-11 VITALS
HEART RATE: 93 BPM | RESPIRATION RATE: 16 BRPM | TEMPERATURE: 97 F | DIASTOLIC BLOOD PRESSURE: 93 MMHG | OXYGEN SATURATION: 96 % | SYSTOLIC BLOOD PRESSURE: 159 MMHG

## 2024-12-11 DIAGNOSIS — S05.10XA: ICD-10-CM

## 2024-12-11 DIAGNOSIS — S09.90XA INJURY OF HEAD, INITIAL ENCOUNTER: ICD-10-CM
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
## 2024-12-11 PROCEDURE — 99205 OFFICE O/P NEW HI 60 MIN: CPT | Performed by: NURSE PRACTITIONER

## 2024-12-11 NOTE — PATIENT INSTRUCTIONS
Triaged to ER for higher level of care for facial head trauma fell on ice this morning with raccoon sign black-purple bruising orbital nasal bony tenderness lip laceration, on aspirin  Urgent care limited without stat CT face and head  Patient is not an [facility] primary care patient, so there is concern for insurance coverage obtaining prior auth for same-day imaging  Discussed [place] head CT scoring

## 2024-12-11 NOTE — PROGRESS NOTES
Chief Complaint   Patient presents with    Urgent Care    Fall     Patient presents with a sore nose and lip laceration after a fall this am.      SUBJECTIVE:  Erin Espinosa is a 74 year old female presenting for facial head trauma, fell on ice this morning with black-purple orbital bruising nasal bony tenderness lip laceration, on aspirin.  Questionable vision change.  Declines loss of consciousness vomiting.     Patient does not want to go to the ER for this.  She states she does not want to sit there and wait for 5 hours.  She is an [facility] patient therefore cannot go to the acute diagnostic center.  Discussed with her my concern for insurance coverage of a CT scan through urgent care.  Urgent care is limited to x-ray and there have been issues with insurance denials due to same-day imaging without prior authorization outside of emergency acute care.    Past Medical History:   Diagnosis Date    Diabetes (H)     Hypertension     PONV (postoperative nausea and vomiting)      Current Outpatient Medications   Medication Sig Dispense Refill    acetaminophen (TYLENOL) 325 MG tablet Take 2 tablets (650 mg) by mouth every 4 hours as needed for other (mild pain) 100 tablet 0    aspirin (ASA) 325 MG EC tablet Take 1 tablet (325 mg) by mouth daily 30 tablet 0    atorvastatin (LIPITOR) 40 MG tablet Take 40 mg by mouth At Bedtime      hydrOXYzine (ATARAX) 10 MG tablet Take 1 tablet (10 mg) by mouth every 6 hours as needed for itching or anxiety (with pain, moderate pain) 30 tablet 0    lisinopril (ZESTRIL) 10 MG tablet Take 10 mg by mouth daily      metFORMIN (GLUCOPHAGE XR) 500 MG 24 hr tablet Take 1 tablet by mouth daily      Multiple Vitamins-Minerals (PRESERVISION AREDS 2+MULTI VIT PO) Take 1 tablet by mouth daily      calcium carbonate-vitamin D (CALTRATE) 600-10 MG-MCG per tablet Take 1 tablet by mouth daily (Patient not taking: Reported on 12/11/2024)      oxyCODONE (ROXICODONE) 5 MG tablet Take 1-2 tablets (5-10 mg)  by mouth every 4 hours as needed for moderate to severe pain (Patient not taking: Reported on 12/11/2024) 25 tablet 0    senna-docusate (SENOKOT-S/PERICOLACE) 8.6-50 MG tablet Take 1 tablet by mouth 2 times daily (Patient not taking: Reported on 12/11/2024) 50 tablet 0     No current facility-administered medications for this visit.     Social History     Tobacco Use    Smoking status: Never    Smokeless tobacco: Never   Substance Use Topics    Alcohol use: Yes     Alcohol/week: 0.0 standard drinks of alcohol     Comment: 0-1 drink per month     Allergies   Allergen Reactions    Ketorolac GI Disturbance    Sulfa Antibiotics Hives       Review of Systems  All systems negative except for those listed above in HPI.    OBJECTIVE:   BP (!) 159/93 (BP Location: Left arm)   Pulse 93   Temp 97  F (36.1  C) (Temporal)   Resp 16   SpO2 96%     Physical Exam  Vitals reviewed.   Constitutional:       Appearance: Normal appearance.   HENT:      Head:      Comments: Purple-black raccoon sign to bilateral orbits  Cardiovascular:      Rate and Rhythm: Normal rate.      Pulses: Normal pulses.   Pulmonary:      Effort: Pulmonary effort is normal.   Musculoskeletal:         General: Normal range of motion.      Cervical back: No rigidity.   Skin:     General: Skin is warm and dry.      Findings: Bruising and erythema present. No rash.   Neurological:      General: No focal deficit present.      Mental Status: She is alert and oriented to person, place, and time.       ASSESSMENT:    ICD-10-CM    1. Fall, initial encounter  W19.XXXA       2. Injury of head, initial encounter  S09.90XA       3. Traumatic ecchymosis of orbit, unspecified laterality, initial encounter  S05.10XA         PLAN:     Triaged to ER for higher level of care for facial head trauma fell on ice this morning with raccoon sign black-purple bruising orbital nasal bony tenderness lip laceration, on aspirin  Urgent care limited without stat CT face and head  Patient  is not an [facility] primary care patient, so there is concern for insurance coverage obtaining prior auth for same-day imaging  Discussed [place] head CT scoring    Follow up with primary care provider with any problems, questions or concerns or if symptoms worsen or fail to improve. Patient agreed to plan and verbalized understanding.    Chhaya Lizarraga, [facility] [place]

## 2024-12-21 ENCOUNTER — HEALTH MAINTENANCE LETTER (OUTPATIENT)
Age: 74
End: 2024-12-21

## (undated) DEVICE — HYDROGEN PEROXIDE 4 OZ HP0304

## (undated) DEVICE — SUTURE VICRYL+ 3-0 27IN CT-1 UND VCP258H

## (undated) DEVICE — SOL NACL 0.9% INJ 1000ML BAG 2B1324X

## (undated) DEVICE — SU FIBERWIRE 2 38" T-8 NDL  AR-7206

## (undated) DEVICE — BLADE SAGITTAL WIDE (SO-618) 2108-118

## (undated) DEVICE — SUTURE MONOCRYL+ 4-0 PS-2 27IN MCP426H

## (undated) DEVICE — DRSG GAUZE 4X4" TRAY 6939

## (undated) DEVICE — DRESSING MEPILEX AG SILVER 4X8 395890

## (undated) DEVICE — SUCTION MANIFOLD NEPTUNE 2 SYS 4 PORT 0702-020-000

## (undated) DEVICE — SU ETHIBOND 1 CT-1 30" X425H

## (undated) DEVICE — SUCTION IRR SYSTEM W/O TIP INTERPULSE HANDPIECE 0210-100-000

## (undated) DEVICE — ALCOHOL ISOPROPYL 4 OZ 70% IA7004

## (undated) DEVICE — MAT FLOOR SURGICAL 40X38 0702140238

## (undated) DEVICE — GUIDEWIRE TORNIER AEQUALIS PERFORM +  2.5X220MM DWD017

## (undated) DEVICE — BONE CLEANING TIP INTERPULSE  0210-010-000

## (undated) DEVICE — DRILL BIT PERIPHERAL SCREW 3.2MM MWJ126

## (undated) DEVICE — SOL WATER IRRIG 1000ML BOTTLE 2F7114

## (undated) DEVICE — HOLDER LIMB VELCRO OR 0814-1533

## (undated) DEVICE — GLOVE SURG PI ULTRA TOUCH M SZ 8-1/2 LF

## (undated) DEVICE — SOL NACL 0.9% IRRIG 1000ML BOTTLE 2F7124

## (undated) DEVICE — GLOVE SURG PI ULTRA TOUCH M SZ 6-1/2 LF

## (undated) DEVICE — GLOVE BIOGEL PI ULTRATOUCH G SZ 8.5 42185

## (undated) DEVICE — NEEDLE SUTURE ANCHOR 1824-4DC

## (undated) DEVICE — CUSTOM PACK OPEN SHOULDER SOP5BOSHEB

## (undated) DEVICE — PLATE GROUNDING ADULT W/CORD 9165L

## (undated) RX ORDER — LIDOCAINE HYDROCHLORIDE 10 MG/ML
INJECTION, SOLUTION EPIDURAL; INFILTRATION; INTRACAUDAL; PERINEURAL
Status: DISPENSED
Start: 2023-04-26

## (undated) RX ORDER — GLYCOPYRROLATE 0.2 MG/ML
INJECTION, SOLUTION INTRAMUSCULAR; INTRAVENOUS
Status: DISPENSED
Start: 2023-04-26

## (undated) RX ORDER — ONDANSETRON 2 MG/ML
INJECTION INTRAMUSCULAR; INTRAVENOUS
Status: DISPENSED
Start: 2023-04-26

## (undated) RX ORDER — DEXAMETHASONE SODIUM PHOSPHATE 10 MG/ML
INJECTION, EMULSION INTRAMUSCULAR; INTRAVENOUS
Status: DISPENSED
Start: 2023-04-26